# Patient Record
Sex: FEMALE | Race: WHITE | NOT HISPANIC OR LATINO | Employment: FULL TIME | URBAN - NONMETROPOLITAN AREA
[De-identification: names, ages, dates, MRNs, and addresses within clinical notes are randomized per-mention and may not be internally consistent; named-entity substitution may affect disease eponyms.]

---

## 2017-01-19 ENCOUNTER — TRANSCRIBE ORDERS (OUTPATIENT)
Dept: ADMINISTRATIVE | Facility: HOSPITAL | Age: 52
End: 2017-01-19

## 2017-01-19 DIAGNOSIS — R51.9 HEADACHE, UNSPECIFIED HEADACHE TYPE: Primary | ICD-10-CM

## 2017-01-20 ENCOUNTER — HOSPITAL ENCOUNTER (OUTPATIENT)
Dept: MRI IMAGING | Facility: HOSPITAL | Age: 52
Discharge: HOME | End: 2017-01-20

## 2017-01-20 ENCOUNTER — HOSPITAL ENCOUNTER (OUTPATIENT)
Dept: MRI IMAGING | Facility: HOSPITAL | Age: 52
Discharge: HOME | End: 2017-01-20
Admitting: NURSE PRACTITIONER

## 2017-01-20 DIAGNOSIS — R51.9 HEADACHE, UNSPECIFIED HEADACHE TYPE: ICD-10-CM

## 2017-01-20 PROCEDURE — 70551 MRI BRAIN STEM W/O DYE: CPT | Performed by: RADIOLOGY

## 2017-01-20 PROCEDURE — 70551 MRI BRAIN STEM W/O DYE: CPT

## 2017-01-20 PROCEDURE — 70544 MR ANGIOGRAPHY HEAD W/O DYE: CPT

## 2017-01-20 PROCEDURE — 70544 MR ANGIOGRAPHY HEAD W/O DYE: CPT | Performed by: RADIOLOGY

## 2017-01-23 ENCOUNTER — TELEPHONE (OUTPATIENT)
Dept: NEUROSURGERY | Facility: CLINIC | Age: 52
End: 2017-01-23

## 2017-01-23 NOTE — TELEPHONE ENCOUNTER
----- Message from Myra Perera sent at 1/23/2017  9:47 AM EST -----  Contact: [phone]  DARINEL CALLING TO LET YOU KNOW SHE HAS HAD HER SCANS COMPLETED AT [place].      PATIENT IS IN [facility].

## 2017-05-30 ENCOUNTER — TRANSCRIBE ORDERS (OUTPATIENT)
Dept: ADMINISTRATIVE | Facility: HOSPITAL | Age: 52
End: 2017-05-30

## 2017-05-30 DIAGNOSIS — M79.605 LEFT LEG PAIN: Primary | ICD-10-CM

## 2017-05-31 ENCOUNTER — OFFICE VISIT (OUTPATIENT)
Dept: CARDIOLOGY | Facility: CLINIC | Age: 52
End: 2017-05-31

## 2017-05-31 ENCOUNTER — HOSPITAL ENCOUNTER (OUTPATIENT)
Dept: CARDIOLOGY | Facility: HOSPITAL | Age: 52
Discharge: HOME | End: 2017-05-31
Admitting: NURSE PRACTITIONER

## 2017-05-31 VITALS — SYSTOLIC BLOOD PRESSURE: 115 MMHG | DIASTOLIC BLOOD PRESSURE: 78 MMHG

## 2017-05-31 DIAGNOSIS — I73.9 PAD (PERIPHERAL ARTERY DISEASE) (HCC): ICD-10-CM

## 2017-05-31 DIAGNOSIS — M79.605 LEFT LEG PAIN: ICD-10-CM

## 2017-05-31 DIAGNOSIS — M32.9 LUPUS (SYSTEMIC LUPUS ERYTHEMATOSUS) (HCC): ICD-10-CM

## 2017-05-31 DIAGNOSIS — R06.09 DYSPNEA ON EXERTION: ICD-10-CM

## 2017-05-31 DIAGNOSIS — R07.2 PRECORDIAL PAIN: Primary | ICD-10-CM

## 2017-05-31 PROCEDURE — 93971 EXTREMITY STUDY: CPT

## 2017-05-31 PROCEDURE — 93000 ELECTROCARDIOGRAM COMPLETE: CPT | Performed by: INTERNAL MEDICINE

## 2017-05-31 PROCEDURE — 93971 EXTREMITY STUDY: CPT | Performed by: RADIOLOGY

## 2017-05-31 PROCEDURE — 99203 OFFICE O/P NEW LOW 30 MIN: CPT | Performed by: INTERNAL MEDICINE

## 2017-05-31 RX ORDER — ATORVASTATIN CALCIUM 10 MG/1
10 TABLET, FILM COATED ORAL DAILY
Qty: 30 TABLET | Refills: 2 | Status: SHIPPED | OUTPATIENT
Start: 2017-05-31 | End: 2018-05-25

## 2017-05-31 RX ORDER — CILOSTAZOL 50 MG/1
50 TABLET ORAL 2 TIMES DAILY
Qty: 60 TABLET | Refills: 2 | Status: SHIPPED | OUTPATIENT
Start: 2017-05-31 | End: 2018-05-25

## 2017-06-01 ENCOUNTER — HOSPITAL ENCOUNTER (OUTPATIENT)
Facility: HOSPITAL | Age: 52
Setting detail: HOSPITAL OUTPATIENT SURGERY
End: 2017-06-01
Attending: INTERNAL MEDICINE | Admitting: INTERNAL MEDICINE

## 2017-06-01 ENCOUNTER — PREP FOR SURGERY (OUTPATIENT)
Dept: OTHER | Facility: HOSPITAL | Age: 52
End: 2017-06-01

## 2017-06-01 ENCOUNTER — TRANSCRIBE ORDERS (OUTPATIENT)
Dept: CARDIOLOGY | Facility: CLINIC | Age: 52
End: 2017-06-01

## 2017-06-01 DIAGNOSIS — I73.9 PVD (PERIPHERAL VASCULAR DISEASE) (HCC): ICD-10-CM

## 2017-06-01 DIAGNOSIS — R07.9 CHEST PAIN, CARDIAC: Primary | ICD-10-CM

## 2017-06-01 DIAGNOSIS — R07.9 CHEST PAIN, CARDIAC: ICD-10-CM

## 2017-06-05 ENCOUNTER — HOSPITAL ENCOUNTER (OUTPATIENT)
Facility: HOSPITAL | Age: 52
Setting detail: OBSERVATION
Discharge: HOME | End: 2017-06-05
Attending: EMERGENCY MEDICINE | Admitting: INTERNAL MEDICINE

## 2017-06-05 ENCOUNTER — APPOINTMENT (OUTPATIENT)
Dept: CT IMAGING | Facility: HOSPITAL | Age: 52
End: 2017-06-05

## 2017-06-05 ENCOUNTER — APPOINTMENT (OUTPATIENT)
Dept: GENERAL RADIOLOGY | Facility: HOSPITAL | Age: 52
End: 2017-06-05

## 2017-06-05 VITALS
SYSTOLIC BLOOD PRESSURE: 118 MMHG | OXYGEN SATURATION: 98 % | DIASTOLIC BLOOD PRESSURE: 84 MMHG | HEIGHT: 65 IN | RESPIRATION RATE: 12 BRPM | WEIGHT: 157.8 LBS | TEMPERATURE: 97.5 F | BODY MASS INDEX: 26.29 KG/M2 | HEART RATE: 57 BPM

## 2017-06-05 DIAGNOSIS — R06.09 DYSPNEA ON EXERTION: ICD-10-CM

## 2017-06-05 DIAGNOSIS — M32.9 LUPUS (SYSTEMIC LUPUS ERYTHEMATOSUS) (HCC): ICD-10-CM

## 2017-06-05 DIAGNOSIS — I24.9 ACUTE CORONARY SYNDROME (HCC): ICD-10-CM

## 2017-06-05 DIAGNOSIS — R07.2 PRECORDIAL PAIN: Primary | ICD-10-CM

## 2017-06-05 DIAGNOSIS — D72.819 LEUKOPENIA, UNSPECIFIED TYPE: ICD-10-CM

## 2017-06-05 DIAGNOSIS — I73.9 PERIPHERAL ARTERY DISEASE (HCC): ICD-10-CM

## 2017-06-05 DIAGNOSIS — I73.9 PAD (PERIPHERAL ARTERY DISEASE) (HCC): ICD-10-CM

## 2017-06-05 LAB
ALBUMIN SERPL-MCNC: 4.8 G/DL (ref 3.2–4.8)
ALBUMIN/GLOB SERPL: 1.6 G/DL (ref 1.5–2.5)
ALP SERPL-CCNC: 61 U/L (ref 25–100)
ALT SERPL W P-5'-P-CCNC: 25 U/L (ref 7–40)
ANION GAP SERPL CALCULATED.3IONS-SCNC: 5 MMOL/L (ref 3–11)
ANION GAP SERPL CALCULATED.3IONS-SCNC: 8 MMOL/L (ref 3–11)
APTT PPP: 27.5 SECONDS (ref 45–60)
APTT PPP: 91.3 SECONDS (ref 45–60)
ARTICHOKE IGE QN: 60 MG/DL (ref 0–130)
AST SERPL-CCNC: 33 U/L (ref 0–33)
BACTERIA UR QL AUTO: NORMAL /HPF
BASOPHILS # BLD AUTO: 0.03 10*3/MM3 (ref 0–0.2)
BASOPHILS # BLD AUTO: 0.05 10*3/MM3 (ref 0–0.2)
BASOPHILS NFR BLD AUTO: 1.5 % (ref 0–1)
BASOPHILS NFR BLD AUTO: 2.9 % (ref 0–1)
BILIRUB SERPL-MCNC: 0.5 MG/DL (ref 0.3–1.2)
BILIRUB UR QL STRIP: NEGATIVE
BNP SERPL-MCNC: 5 PG/ML (ref 0–100)
BUN BLD-MCNC: 13 MG/DL (ref 9–23)
BUN BLD-MCNC: 15 MG/DL (ref 9–23)
BUN/CREAT SERPL: 13 (ref 7–25)
BUN/CREAT SERPL: 15 (ref 7–25)
CALCIUM SPEC-SCNC: 10.1 MG/DL (ref 8.7–10.4)
CALCIUM SPEC-SCNC: 9.6 MG/DL (ref 8.7–10.4)
CHLORIDE SERPL-SCNC: 107 MMOL/L (ref 99–109)
CHLORIDE SERPL-SCNC: 109 MMOL/L (ref 99–109)
CHOLEST SERPL-MCNC: 144 MG/DL (ref 0–200)
CLARITY UR: CLEAR
CO2 SERPL-SCNC: 28 MMOL/L (ref 20–31)
CO2 SERPL-SCNC: 30 MMOL/L (ref 20–31)
COLOR UR: YELLOW
CREAT BLD-MCNC: 1 MG/DL (ref 0.6–1.3)
CREAT BLD-MCNC: 1 MG/DL (ref 0.6–1.3)
CRP SERPL-MCNC: 0.43 MG/DL (ref 0–1)
DEPRECATED RDW RBC AUTO: 39.1 FL (ref 37–54)
DEPRECATED RDW RBC AUTO: 39.4 FL (ref 37–54)
EOSINOPHIL # BLD AUTO: 0.01 10*3/MM3 (ref 0.1–0.3)
EOSINOPHIL # BLD AUTO: 0.02 10*3/MM3 (ref 0.1–0.3)
EOSINOPHIL NFR BLD AUTO: 0.6 % (ref 0–3)
EOSINOPHIL NFR BLD AUTO: 1 % (ref 0–3)
ERYTHROCYTE [DISTWIDTH] IN BLOOD BY AUTOMATED COUNT: 11.6 % (ref 11.3–14.5)
ERYTHROCYTE [DISTWIDTH] IN BLOOD BY AUTOMATED COUNT: 11.7 % (ref 11.3–14.5)
ERYTHROCYTE [SEDIMENTATION RATE] IN BLOOD: 3 MM/HR (ref 0–30)
GFR SERPL CREATININE-BSD FRML MDRD: 59 ML/MIN/1.73
GFR SERPL CREATININE-BSD FRML MDRD: 59 ML/MIN/1.73
GLOBULIN UR ELPH-MCNC: 3 GM/DL
GLUCOSE BLD-MCNC: 95 MG/DL (ref 70–100)
GLUCOSE BLD-MCNC: 96 MG/DL (ref 70–100)
GLUCOSE UR STRIP-MCNC: NEGATIVE MG/DL
HBA1C MFR BLD: 4.8 % (ref 4.8–5.6)
HCT VFR BLD AUTO: 40.9 % (ref 34.5–44)
HCT VFR BLD AUTO: 43.9 % (ref 34.5–44)
HDLC SERPL-MCNC: 68 MG/DL (ref 40–60)
HGB BLD-MCNC: 13.7 G/DL (ref 11.5–15.5)
HGB BLD-MCNC: 15 G/DL (ref 11.5–15.5)
HGB UR QL STRIP.AUTO: NEGATIVE
HOLD SPECIMEN: NORMAL
HOLD SPECIMEN: NORMAL
HYALINE CASTS UR QL AUTO: NORMAL /LPF
IMM GRANULOCYTES # BLD: 0.01 10*3/MM3 (ref 0–0.03)
IMM GRANULOCYTES # BLD: 0.01 10*3/MM3 (ref 0–0.03)
IMM GRANULOCYTES NFR BLD: 0.5 % (ref 0–0.6)
IMM GRANULOCYTES NFR BLD: 0.6 % (ref 0–0.6)
INR PPP: 0.89
KETONES UR QL STRIP: NEGATIVE
LEUKOCYTE ESTERASE UR QL STRIP.AUTO: ABNORMAL
LIPASE SERPL-CCNC: 56 U/L (ref 6–51)
LYMPHOCYTES # BLD AUTO: 0.71 10*3/MM3 (ref 0.6–4.8)
LYMPHOCYTES # BLD AUTO: 0.94 10*3/MM3 (ref 0.6–4.8)
LYMPHOCYTES NFR BLD AUTO: 41.3 % (ref 24–44)
LYMPHOCYTES NFR BLD AUTO: 46.5 % (ref 24–44)
MCH RBC QN AUTO: 30.4 PG (ref 27–31)
MCH RBC QN AUTO: 31.1 PG (ref 27–31)
MCHC RBC AUTO-ENTMCNC: 33.5 G/DL (ref 32–36)
MCHC RBC AUTO-ENTMCNC: 34.2 G/DL (ref 32–36)
MCV RBC AUTO: 90.7 FL (ref 80–99)
MCV RBC AUTO: 91.1 FL (ref 80–99)
MONOCYTES # BLD AUTO: 0.21 10*3/MM3 (ref 0–1)
MONOCYTES # BLD AUTO: 0.25 10*3/MM3 (ref 0–1)
MONOCYTES NFR BLD AUTO: 12.2 % (ref 0–12)
MONOCYTES NFR BLD AUTO: 12.4 % (ref 0–12)
NEUTROPHILS # BLD AUTO: 0.73 10*3/MM3 (ref 1.5–8.3)
NEUTROPHILS # BLD AUTO: 0.77 10*3/MM3 (ref 1.5–8.3)
NEUTROPHILS NFR BLD AUTO: 38.1 % (ref 41–71)
NEUTROPHILS NFR BLD AUTO: 42.4 % (ref 41–71)
NITRITE UR QL STRIP: NEGATIVE
PH UR STRIP.AUTO: 6.5 [PH] (ref 5–8)
PLAT MORPH BLD: NORMAL
PLATELET # BLD AUTO: 140 10*3/MM3 (ref 150–450)
PLATELET # BLD AUTO: 156 10*3/MM3 (ref 150–450)
PMV BLD AUTO: 11 FL (ref 6–12)
PMV BLD AUTO: 11.4 FL (ref 6–12)
POTASSIUM BLD-SCNC: 3.6 MMOL/L (ref 3.5–5.5)
POTASSIUM BLD-SCNC: 4.3 MMOL/L (ref 3.5–5.5)
PROT SERPL-MCNC: 7.8 G/DL (ref 5.7–8.2)
PROT UR QL STRIP: NEGATIVE
PROTHROMBIN TIME: 9.7 SECONDS (ref 9.6–11.5)
RBC # BLD AUTO: 4.51 10*6/MM3 (ref 3.89–5.14)
RBC # BLD AUTO: 4.82 10*6/MM3 (ref 3.89–5.14)
RBC # UR: NORMAL /HPF
RBC MORPH BLD: NORMAL
REF LAB TEST METHOD: NORMAL
SODIUM BLD-SCNC: 143 MMOL/L (ref 132–146)
SODIUM BLD-SCNC: 144 MMOL/L (ref 132–146)
SP GR UR STRIP: 1.01 (ref 1–1.03)
SQUAMOUS #/AREA URNS HPF: NORMAL /HPF
TRIGL SERPL-MCNC: 29 MG/DL (ref 0–150)
TROPONIN I SERPL-MCNC: 0 NG/ML (ref 0–0.07)
TROPONIN I SERPL-MCNC: 0 NG/ML (ref 0–0.07)
TROPONIN I SERPL-MCNC: <0.006 NG/ML
UROBILINOGEN UR QL STRIP: ABNORMAL
WBC MORPH BLD: NORMAL
WBC NRBC COR # BLD: 1.72 10*3/MM3 (ref 3.5–10.8)
WBC NRBC COR # BLD: 2.02 10*3/MM3 (ref 3.5–10.8)
WBC UR QL AUTO: NORMAL /HPF
WHOLE BLOOD HOLD SPECIMEN: NORMAL
WHOLE BLOOD HOLD SPECIMEN: NORMAL

## 2017-06-05 PROCEDURE — G0378 HOSPITAL OBSERVATION PER HR: HCPCS

## 2017-06-05 PROCEDURE — 86256 FLUORESCENT ANTIBODY TITER: CPT | Performed by: NURSE PRACTITIONER

## 2017-06-05 PROCEDURE — 84484 ASSAY OF TROPONIN QUANT: CPT

## 2017-06-05 PROCEDURE — 80048 BASIC METABOLIC PNL TOTAL CA: CPT | Performed by: NURSE PRACTITIONER

## 2017-06-05 PROCEDURE — 83036 HEMOGLOBIN GLYCOSYLATED A1C: CPT | Performed by: NURSE PRACTITIONER

## 2017-06-05 PROCEDURE — 86038 ANTINUCLEAR ANTIBODIES: CPT | Performed by: NURSE PRACTITIONER

## 2017-06-05 PROCEDURE — 96374 THER/PROPH/DIAG INJ IV PUSH: CPT

## 2017-06-05 PROCEDURE — 99220 PR INITIAL OBSERVATION CARE/DAY 70 MINUTES: CPT | Performed by: INTERNAL MEDICINE

## 2017-06-05 PROCEDURE — 85610 PROTHROMBIN TIME: CPT | Performed by: EMERGENCY MEDICINE

## 2017-06-05 PROCEDURE — 25010000002 HEPARIN (PORCINE) PER 1000 UNITS: Performed by: INTERNAL MEDICINE

## 2017-06-05 PROCEDURE — 85025 COMPLETE CBC W/AUTO DIFF WBC: CPT | Performed by: NURSE PRACTITIONER

## 2017-06-05 PROCEDURE — 93458 L HRT ARTERY/VENTRICLE ANGIO: CPT | Performed by: INTERNAL MEDICINE

## 2017-06-05 PROCEDURE — 71010 HC CHEST PA OR AP: CPT

## 2017-06-05 PROCEDURE — 83880 ASSAY OF NATRIURETIC PEPTIDE: CPT | Performed by: EMERGENCY MEDICINE

## 2017-06-05 PROCEDURE — 83520 IMMUNOASSAY QUANT NOS NONAB: CPT | Performed by: NURSE PRACTITIONER

## 2017-06-05 PROCEDURE — 75630 X-RAY AORTA LEG ARTERIES: CPT | Performed by: INTERNAL MEDICINE

## 2017-06-05 PROCEDURE — 80061 LIPID PANEL: CPT | Performed by: NURSE PRACTITIONER

## 2017-06-05 PROCEDURE — 93005 ELECTROCARDIOGRAM TRACING: CPT | Performed by: EMERGENCY MEDICINE

## 2017-06-05 PROCEDURE — C1769 GUIDE WIRE: HCPCS | Performed by: INTERNAL MEDICINE

## 2017-06-05 PROCEDURE — 96376 TX/PRO/DX INJ SAME DRUG ADON: CPT

## 2017-06-05 PROCEDURE — 86225 DNA ANTIBODY NATIVE: CPT | Performed by: NURSE PRACTITIONER

## 2017-06-05 PROCEDURE — 85652 RBC SED RATE AUTOMATED: CPT | Performed by: NURSE PRACTITIONER

## 2017-06-05 PROCEDURE — 85007 BL SMEAR W/DIFF WBC COUNT: CPT | Performed by: EMERGENCY MEDICINE

## 2017-06-05 PROCEDURE — 25010000002 HEPARIN (PORCINE) PER 1000 UNITS: Performed by: NURSE PRACTITIONER

## 2017-06-05 PROCEDURE — 96368 THER/DIAG CONCURRENT INF: CPT

## 2017-06-05 PROCEDURE — 80053 COMPREHEN METABOLIC PANEL: CPT | Performed by: EMERGENCY MEDICINE

## 2017-06-05 PROCEDURE — 96366 THER/PROPH/DIAG IV INF ADDON: CPT

## 2017-06-05 PROCEDURE — C1894 INTRO/SHEATH, NON-LASER: HCPCS | Performed by: INTERNAL MEDICINE

## 2017-06-05 PROCEDURE — 93005 ELECTROCARDIOGRAM TRACING: CPT | Performed by: NURSE PRACTITIONER

## 2017-06-05 PROCEDURE — 85730 THROMBOPLASTIN TIME PARTIAL: CPT | Performed by: EMERGENCY MEDICINE

## 2017-06-05 PROCEDURE — 25010000002 MIDAZOLAM PER 1 MG: Performed by: INTERNAL MEDICINE

## 2017-06-05 PROCEDURE — 85730 THROMBOPLASTIN TIME PARTIAL: CPT

## 2017-06-05 PROCEDURE — 84484 ASSAY OF TROPONIN QUANT: CPT | Performed by: NURSE PRACTITIONER

## 2017-06-05 PROCEDURE — 85025 COMPLETE CBC W/AUTO DIFF WBC: CPT | Performed by: EMERGENCY MEDICINE

## 2017-06-05 PROCEDURE — 25010000002 HEPARIN (PORCINE) PER 1000 UNITS: Performed by: PHYSICIAN ASSISTANT

## 2017-06-05 PROCEDURE — S0260 H&P FOR SURGERY: HCPCS | Performed by: INTERNAL MEDICINE

## 2017-06-05 PROCEDURE — 83690 ASSAY OF LIPASE: CPT | Performed by: EMERGENCY MEDICINE

## 2017-06-05 PROCEDURE — 99284 EMERGENCY DEPT VISIT MOD MDM: CPT

## 2017-06-05 PROCEDURE — 96365 THER/PROPH/DIAG IV INF INIT: CPT

## 2017-06-05 PROCEDURE — 71275 CT ANGIOGRAPHY CHEST: CPT

## 2017-06-05 PROCEDURE — 86140 C-REACTIVE PROTEIN: CPT | Performed by: NURSE PRACTITIONER

## 2017-06-05 PROCEDURE — 81001 URINALYSIS AUTO W/SCOPE: CPT | Performed by: NURSE PRACTITIONER

## 2017-06-05 PROCEDURE — 0 IOPAMIDOL PER 1 ML: Performed by: EMERGENCY MEDICINE

## 2017-06-05 RX ORDER — ASPIRIN 81 MG/1
324 TABLET, CHEWABLE ORAL ONCE
Status: DISCONTINUED | OUTPATIENT
Start: 2017-06-05 | End: 2017-06-06 | Stop reason: HOSPADM

## 2017-06-05 RX ORDER — SODIUM CHLORIDE 9 MG/ML
100 INJECTION, SOLUTION INTRAVENOUS CONTINUOUS
Status: ACTIVE | OUTPATIENT
Start: 2017-06-05 | End: 2017-06-05

## 2017-06-05 RX ORDER — CLOPIDOGREL BISULFATE 75 MG/1
75 TABLET ORAL DAILY
Status: DISCONTINUED | OUTPATIENT
Start: 2017-06-06 | End: 2017-06-06 | Stop reason: HOSPADM

## 2017-06-05 RX ORDER — CLOPIDOGREL BISULFATE 75 MG/1
600 TABLET ORAL ONCE
Status: COMPLETED | OUTPATIENT
Start: 2017-06-05 | End: 2017-06-05

## 2017-06-05 RX ORDER — LIDOCAINE HYDROCHLORIDE 10 MG/ML
INJECTION, SOLUTION INFILTRATION; PERINEURAL AS NEEDED
Status: DISCONTINUED | OUTPATIENT
Start: 2017-06-05 | End: 2017-06-05 | Stop reason: HOSPADM

## 2017-06-05 RX ORDER — ACETAMINOPHEN 325 MG/1
650 TABLET ORAL EVERY 6 HOURS PRN
Status: DISCONTINUED | OUTPATIENT
Start: 2017-06-05 | End: 2017-06-06 | Stop reason: HOSPADM

## 2017-06-05 RX ORDER — HEPARIN SODIUM 1000 [USP'U]/ML
60 INJECTION, SOLUTION INTRAVENOUS; SUBCUTANEOUS AS NEEDED
Status: DISCONTINUED | OUTPATIENT
Start: 2017-06-05 | End: 2017-06-05 | Stop reason: DRUGHIGH

## 2017-06-05 RX ORDER — ONDANSETRON 2 MG/ML
4 INJECTION INTRAMUSCULAR; INTRAVENOUS EVERY 6 HOURS PRN
Status: DISCONTINUED | OUTPATIENT
Start: 2017-06-05 | End: 2017-06-06 | Stop reason: HOSPADM

## 2017-06-05 RX ORDER — SODIUM CHLORIDE 0.9 % (FLUSH) 0.9 %
10 SYRINGE (ML) INJECTION AS NEEDED
Status: DISCONTINUED | OUTPATIENT
Start: 2017-06-05 | End: 2017-06-06 | Stop reason: HOSPADM

## 2017-06-05 RX ORDER — MIDAZOLAM HYDROCHLORIDE 1 MG/ML
INJECTION INTRAMUSCULAR; INTRAVENOUS AS NEEDED
Status: DISCONTINUED | OUTPATIENT
Start: 2017-06-05 | End: 2017-06-05 | Stop reason: HOSPADM

## 2017-06-05 RX ORDER — HEPARIN SODIUM 1000 [USP'U]/ML
30 INJECTION, SOLUTION INTRAVENOUS; SUBCUTANEOUS AS NEEDED
Status: DISCONTINUED | OUTPATIENT
Start: 2017-06-05 | End: 2017-06-05 | Stop reason: DRUGHIGH

## 2017-06-05 RX ORDER — ATORVASTATIN CALCIUM 40 MG/1
80 TABLET, FILM COATED ORAL NIGHTLY
Status: DISCONTINUED | OUTPATIENT
Start: 2017-06-05 | End: 2017-06-06 | Stop reason: HOSPADM

## 2017-06-05 RX ORDER — CARVEDILOL 3.12 MG/1
3.12 TABLET ORAL 2 TIMES DAILY
Status: DISCONTINUED | OUTPATIENT
Start: 2017-06-05 | End: 2017-06-06 | Stop reason: HOSPADM

## 2017-06-05 RX ORDER — ESTRADIOL 0.04 MG/D
1 PATCH TRANSDERMAL WEEKLY
COMMUNITY
End: 2018-05-25

## 2017-06-05 RX ORDER — HYDROXYCHLOROQUINE SULFATE 200 MG/1
100 TABLET, FILM COATED ORAL 2 TIMES DAILY
COMMUNITY
End: 2018-05-25

## 2017-06-05 RX ORDER — ASPIRIN 81 MG/1
81 TABLET ORAL DAILY
Status: DISCONTINUED | OUTPATIENT
Start: 2017-06-06 | End: 2017-06-06 | Stop reason: HOSPADM

## 2017-06-05 RX ORDER — NITROGLYCERIN 20 MG/100ML
5-200 INJECTION INTRAVENOUS
Status: DISCONTINUED | OUTPATIENT
Start: 2017-06-05 | End: 2017-06-05

## 2017-06-05 RX ORDER — ASPIRIN 81 MG/1
81 TABLET, CHEWABLE ORAL DAILY
COMMUNITY
End: 2017-06-19 | Stop reason: DRUGHIGH

## 2017-06-05 RX ORDER — HEPARIN SODIUM 1000 [USP'U]/ML
57.2 INJECTION, SOLUTION INTRAVENOUS; SUBCUTANEOUS ONCE
Status: COMPLETED | OUTPATIENT
Start: 2017-06-05 | End: 2017-06-05

## 2017-06-05 RX ORDER — SODIUM CHLORIDE 0.9 % (FLUSH) 0.9 %
1-10 SYRINGE (ML) INJECTION AS NEEDED
Status: DISCONTINUED | OUTPATIENT
Start: 2017-06-05 | End: 2017-06-06 | Stop reason: HOSPADM

## 2017-06-05 RX ADMIN — CARVEDILOL 3.12 MG: 3.12 TABLET, FILM COATED ORAL at 17:57

## 2017-06-05 RX ADMIN — NITROGLYCERIN 5 MCG/MIN: 20 INJECTION INTRAVENOUS at 02:04

## 2017-06-05 RX ADMIN — IOPAMIDOL 55 ML: 755 INJECTION, SOLUTION INTRAVENOUS at 01:59

## 2017-06-05 RX ADMIN — HEPARIN SODIUM 12 UNITS/KG/HR: 10000 INJECTION, SOLUTION INTRAVENOUS at 05:08

## 2017-06-05 RX ADMIN — SODIUM CHLORIDE 100 ML/HR: 9 INJECTION, SOLUTION INTRAVENOUS at 15:00

## 2017-06-05 RX ADMIN — HEPARIN SODIUM 12 UNITS/KG/HR: 10000 INJECTION, SOLUTION INTRAVENOUS at 05:32

## 2017-06-05 RX ADMIN — CARVEDILOL 3.12 MG: 3.12 TABLET, FILM COATED ORAL at 08:25

## 2017-06-05 RX ADMIN — CLOPIDOGREL BISULFATE 600 MG: 75 TABLET ORAL at 05:14

## 2017-06-05 RX ADMIN — HEPARIN SODIUM 4000 UNITS: 1000 INJECTION, SOLUTION INTRAVENOUS; SUBCUTANEOUS at 04:35

## 2017-06-05 RX ADMIN — ACETAMINOPHEN 650 MG: 325 TABLET, FILM COATED ORAL at 05:14

## 2017-06-05 RX ADMIN — HEPARIN SODIUM 12 UNITS/KG/HR: 10000 INJECTION, SOLUTION INTRAVENOUS at 04:36

## 2017-06-05 NOTE — PROGRESS NOTES
"Hospitalist  NOTE     Date of Admission: 6/5/2017   LOS: 0 days   PCP: LAURIE Esparza  Ms. Elham Calderón, 51 y.o. female is followed for:  Chief Complaint: chest pain, SOA           SUBJECTIVE   HPI: Patient is sitting up in bed in NAD. She states she is feeling better has some cp this am. She denies any soa, fever, chills or n/v/d. She currently has a TR band on her left wrist with a soft hematoma below band with an ice pack. She also had a IV infiltrate in same area.   Subjective:  Symptoms:  She reports chest pain.  No shortness of breath or diarrhea.    Diet:  No nausea or vomiting.        The patient's relevant past medical, surgical and social history were reviewed and updated in Epic as appropriate.    Review of Systems:  Review of Systems   Constitutional: Negative for appetite change, chills and fever.   Respiratory: Negative for shortness of breath and wheezing.    Cardiovascular: Positive for chest pain. Negative for palpitations and leg swelling.   Gastrointestinal: Negative for constipation, diarrhea, nausea and vomiting.   Genitourinary: Negative for dysuria.     Otherwise complete ROS negative except as mentioned in the HPI        OBJECTIVE     Vital Sign Min/Max for last 24 hours  Temp  Min: 97.8 °F (36.6 °C)  Max: 98 °F (36.7 °C)   BP  Min: 91/70  Max: 155/86   Pulse  Min: 60  Max: 81   Resp  Min: 16  Max: 20   SpO2  Min: 92 %  Max: 100 %   No Data Recorded   Weight  Min: 154 lb (69.9 kg)  Max: 157 lb 12.8 oz (71.6 kg)    No intake or output data in the 24 hours ending 06/05/17 1447   Telemetry sr Last 3 weights    06/05/17  0040 06/05/17  0506   Weight: 154 lb (69.9 kg) 157 lb 12.8 oz (71.6 kg)          Objective:  Vital signs: (most recent): Blood pressure 130/78, pulse 77, temperature 98 °F (36.7 °C), temperature source Oral, resp. rate 16, height 65\" (165.1 cm), weight 157 lb 12.8 oz (71.6 kg), SpO2 96 %.  No fever.              Physical Exam:   Constitutional: Oriented to person, place, " and time. Well-developed and well-nourished. No distress.   Head: Normocephalic and atraumatic.    Eyes: Conjunctivae and EOM are normal.   Neck: Normal range of motion.   Cardiovascular: Normal rate, regular rhythm, normal heart sounds and intact distal pulses.  Exam reveals no gallop and no friction rub.  No murmur appreciated.  Pulmonary/Chest: Effort normal and breath sounds normal. No respiratory distress.  No rales rhonchi or wheezing   Abdominal: Soft.  Normal bowel sounds, no distention, no mass, no tenderness  Musculoskeletal: Normal range of motion. He exhibits no edema.   Neurological: No focal deficits   Skin: Skin is warm and dry. No rash noted. Nails show no clubbing. pt has left wrist TR band with hematoma below band.   Mood: Appropriate and pleasant  Results:    Results from last 7 days  Lab Units 06/05/17 0524 06/05/17 0054   WBC 10*3/mm3 2.02* 1.72*   HEMOGLOBIN g/dL 13.7 15.0   HEMATOCRIT % 40.9 43.9   PLATELETS 10*3/mm3 140* 156       Results from last 7 days  Lab Units 06/05/17 0524   SODIUM mmol/L 143   POTASSIUM mmol/L 3.6   CHLORIDE mmol/L 107   TOTAL CO2 mmol/L 28.0   BUN mg/dL 13   CREATININE mg/dL 1.00   GLUCOSE mg/dL 96   CALCIUM mg/dL 9.6       Results from last 7 days  Lab Units 06/05/17 0054   ALK PHOS U/L 61   BILIRUBIN mg/dL 0.5   ALT (SGPT) U/L 25   AST (SGOT) U/L 33         Imaging Results (last 24 hours)     Procedure Component Value Units Date/Time    XR Chest 1 View [[id number]]  (Abnormal) Collected:  06/05/17 0040     Updated:  06/05/17 0142    Narrative:         EXAM:    XR Chest, 1 View    CLINICAL HISTORY:    51 years old, female; Pain; Chest pain and sternal or substernal pain; On   breathing; Additional info: Chest pain triage protocol    TECHNIQUE:    Frontal view of the chest.    COMPARISON:    No relevant prior studies available.    FINDINGS:    Lungs:  The lungs are clear.    Pleural space:  Unremarkable.  No pneumothorax.    Heart:  Unremarkable.    Mediastinum:   Unremarkable.    Bones/joints:  Unremarkable.      Impression:         No acute findings.    THIS DOCUMENT HAS BEEN ELECTRONICALLY SIGNED BY LIZZIE RANDOLPH MD    CT Angiogram Chest With & Without Contrast [[id number]] Collected:  06/05/17 0054     Updated:  06/05/17 0236    Narrative:         EXAM:    CT Angiography Chest With Intravenous Contrast    CLINICAL HISTORY:    51 years old, female; Signs and symptoms; Shortness of breath; Additional   info: SOA cp, recent dx polliteal occlusion    TECHNIQUE:    Axial computed tomographic angiography images of the chest with intravenous   contrast using pulmonary embolism protocol.  This CT exam was performed using   one or more of the following dose reduction techniques:  automated exposure   control, adjustment of the mA and/or kV according to patient size, and/or use   of iterative reconstruction technique.    MIP reconstructed images were created and reviewed.    CONTRAST:    55 mL of [id number] administered intravenously.    COMPARISON:    No relevant prior studies available.    FINDINGS:    Pulmonary arteries:  No pulmonary embolism is identified.    Aorta:  No acute findings.  No thoracic aortic aneurysm.    Lungs: Unremarkable.  No mass.  No consolidation.    Pleural space:  No significant effusion.  No pneumothorax.    Heart:  Unremarkable.    Bones/joints:  No acute fracture.  No dislocation.    Soft tissues:  Unremarkable.    Lymph nodes:  Unremarkable. No enlarged lymph nodes.      Impression:         No evidence of pulmonary embolus.    THIS DOCUMENT HAS BEEN ELECTRONICALLY SIGNED BY LIZZIE RANDOLPH MD        Lab Results   Component Value Date    GLUCOSE 96 06/05/2017    GLUCOSE 95 06/05/2017     Current Medications:  aspirin 324 mg Oral Once   [START ON 6/6/2017] aspirin 81 mg Oral Daily   atorvastatin 80 mg Oral Nightly   carvedilol 3.125 mg Oral BID   [START ON 6/6/2017] clopidogrel 75 mg Oral Daily       sodium chloride 100 mL/hr       I reviewed the patient's  results and images. Images:    Medications reviewed.      Assessment/Plan   ASSESSMENT / PLAN    Principal Problem:    Acute coronary syndrome  Active Problems:    Precordial pain    Dyspnea on exertion    PAD (peripheral artery disease) and up to 6% stenosis in the mid popliteal artery    Lupus (systemic lupus erythematosus)    Claudication of left lower extremity  51 Y f, presented to ed-chest pain-central squeezing relief with nitro.  Worse today as compared to past few months.  As's with dyspnea, could not catch deep breath.  No fever, no as's diaphoresis, n, v.  -no cough.  EKG, MAGGIE negative.  Also recently was dx, with L.lower ext pain-with popliteal artery stenosis-suppose to have angiogram with Dr. 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  Because of Gen fatigue and high double str-dna positive, was dx with SLE-on plaquenil currently.     Patient had heart cath today with lower extremity angiograms. Heart cath showed no significant CAD. EF 65%. Patient did have 90% stenosis in left popliteal artery. Wendy consulted will await his recommendations.       Assessment & Plan      Plan of care and goals reviewed with patient and staff.      I discussed the patient's findings and my recommendations with patient      Anuradha Calderon, LAURIE 06/05/17 2:47 PM

## 2017-06-05 NOTE — PLAN OF CARE
"Problem: Patient Care Overview (Adult)  Goal: Plan of Care Review    06/05/17 0524   Coping/Psychosocial Response Interventions   Plan Of Care Reviewed With patient   Patient Care Overview   Progress no change   Outcome Evaluation   Outcome Summary/Follow up Plan VSS. Continues to have \"squeezing\" midsternal chest discomfort, 1 on 1-10 scale. NTG and Heparin gtt continues. Awaiting cardiology consult.           "

## 2017-06-05 NOTE — H&P
[facility] Medicine Services  HISTORY AND PHYSICAL    Primary Care Physician: LAURIE Esparza    Subjective     Chief Complaint: chest pain, SOA    History of Present Illness:   51 year old female with PMH significant for recent diagnosis of lupus and recent diagnosis of left popliteal artery occlusion that presents to the ED with complaints of chest pain. She states that over the past 6+ months, she has had intermittent chest pain. She notes that she flew to [place] this past week, and since returning her cp has increased. She rates the pain 2/10 and states that it remains intermittent but has been occurring more often over the past several days and now is a squeezing sensation and has caused her to be SOA.  She denies any N/V/D or fever. She notes that she was scheduled with Dr Atkinson later this week for heart cath and angiogram. Hospital Medicine will admit and follow.       Review of Systems   Constitutional: Positive for activity change. Negative for appetite change, diaphoresis, fatigue, fever and unexpected weight change.   HENT: Negative.    Respiratory: Positive for chest tightness and shortness of breath. Negative for cough and wheezing.    Cardiovascular: Positive for chest pain. Negative for palpitations and leg swelling.   Gastrointestinal: Negative for abdominal pain, constipation, diarrhea, nausea and vomiting.   Genitourinary: Negative for dysuria, frequency and urgency.   Musculoskeletal: Positive for myalgias. Negative for back pain.        Claudication left leg   Skin: Negative for color change and pallor.   Neurological: Negative for dizziness, syncope, weakness and headaches.   Psychiatric/Behavioral: Negative for confusion. The patient is not nervous/anxious.       Otherwise complete ROS performed and negative except as mentioned in the HPI.    Past Medical History:   Diagnosis Date   • Familial neutropenia    • Lupus        Past Surgical History:   Procedure  "Laterality Date   • HYSTERECTOMY         Family History   Problem Relation Age of Onset   • Heart disease Father        Social History     Social History   • Marital status: Single     Spouse name: N/A   • Number of children: N/A   • Years of education: N/A     Occupational History   • Not on file.     Social History Main Topics   • Smoking status: Never Smoker   • Smokeless tobacco: Not on file   • Alcohol use 1.2 oz/week     2 Glasses of wine per week   • Drug use: No   • Sexual activity: Not on file     Other Topics Concern   • Not on file     Social History Narrative   • No narrative on file       Medications:    (Not in a hospital admission)    Allergies:  No Known Allergies      Objective     Physical Exam:  Vital Signs: /83  Pulse 74  Temp 98 °F (36.7 °C) (Oral)   Resp 20  Ht 65\" (165.1 cm)  Wt 154 lb (69.9 kg)  SpO2 98%  BMI 25.63 kg/m2  Physical Exam   Constitutional: She is oriented to person, place, and time. She appears well-developed and well-nourished. No distress.   HENT:   Head: Normocephalic.   Eyes: Pupils are equal, round, and reactive to light.   Neck: Neck supple. No JVD present.   Cardiovascular: Normal rate, regular rhythm, normal heart sounds and intact distal pulses.  Exam reveals no gallop and no friction rub.    No murmur heard.  Pulmonary/Chest: Effort normal and breath sounds normal. She has no wheezes. She has no rales.   Abdominal: Soft. Bowel sounds are normal. She exhibits no distension. There is no tenderness.   Musculoskeletal: Normal range of motion. She exhibits no edema or tenderness.   Neurological: She is alert and oriented to person, place, and time.   Skin: Skin is warm and dry. She is not diaphoretic.   Psychiatric: Her speech is normal and behavior is normal. Judgment and thought content normal. Her mood appears anxious. Cognition and memory are normal.   Vitals reviewed.          Results Reviewed:    Results from last 7 days  Lab Units 06/05/17  0054   WBC " 10*3/mm3 1.72*   HEMOGLOBIN g/dL 15.0   PLATELETS 10*3/mm3 156       Results from last 7 days  Lab Units 06/05/17  0054   SODIUM mmol/L 144   POTASSIUM mmol/L 4.3   TOTAL CO2 mmol/L 30.0   CREATININE mg/dL 1.00   GLUCOSE mg/dL 95   CALCIUM mg/dL 10.1     Imaging Results (last 24 hours)     Procedure Component Value Units Date/Time    XR Chest 1 View [[id number]]  (Abnormal) Collected:  06/05/17 0040     Updated:  06/05/17 0142    Narrative:         EXAM:    XR Chest, 1 View    CLINICAL HISTORY:    51 years old, female; Pain; Chest pain and sternal or substernal pain; On   breathing; Additional info: Chest pain triage protocol    TECHNIQUE:    Frontal view of the chest.    COMPARISON:    No relevant prior studies available.    FINDINGS:    Lungs:  The lungs are clear.    Pleural space:  Unremarkable.  No pneumothorax.    Heart:  Unremarkable.    Mediastinum:  Unremarkable.    Bones/joints:  Unremarkable.      Impression:         No acute findings.    THIS DOCUMENT HAS BEEN ELECTRONICALLY SIGNED BY LIZZIE RANDOLPH MD    CT Angiogram Chest With & Without Contrast [[id number]] Collected:  06/05/17 0054     Updated:  06/05/17 0236    Narrative:         EXAM:    CT Angiography Chest With Intravenous Contrast    CLINICAL HISTORY:    51 years old, female; Signs and symptoms; Shortness of breath; Additional   info: SOA cp, recent dx polliteal occlusion    TECHNIQUE:    Axial computed tomographic angiography images of the chest with intravenous   contrast using pulmonary embolism protocol.  This CT exam was performed using   one or more of the following dose reduction techniques:  automated exposure   control, adjustment of the mA and/or kV according to patient size, and/or use   of iterative reconstruction technique.    MIP reconstructed images were created and reviewed.    CONTRAST:    55 mL of [id number] administered intravenously.    COMPARISON:    No relevant prior studies available.    FINDINGS:    Pulmonary arteries:  No  pulmonary embolism is identified.    Aorta:  No acute findings.  No thoracic aortic aneurysm.    Lungs: Unremarkable.  No mass.  No consolidation.    Pleural space:  No significant effusion.  No pneumothorax.    Heart:  Unremarkable.    Bones/joints:  No acute fracture.  No dislocation.    Soft tissues:  Unremarkable.    Lymph nodes:  Unremarkable. No enlarged lymph nodes.      Impression:         No evidence of pulmonary embolus.    THIS DOCUMENT HAS BEEN ELECTRONICALLY SIGNED BY LIZZIE RANDOLPH MD          I have personally reviewed and interpreted available lab data, radiology studies and ECG obtained at time of admission.     Assessment / Plan     Assessment/Problem List:   Hospital Problem List     * (Principal)Acute coronary syndrome    Dyspnea on exertion    PAD (peripheral artery disease) and up to 6% stenosis in the mid popliteal artery    Lupus (systemic lupus erythematosus)    Claudication of left lower extremity        51 year old female presenting to ED with complaints of intermittent chest pain for the past 6 months that has increased in frequency and intensity over the past several days. Recent diagnosis of Lupus as well as popliteal artery occlusion.     Plan:  Acute Coronary Syndrome  -Consult Cardiology in AM, Dr Atkinson  -NPO  -Heparin gtt  -Nitro gtt  -Trend troponin  -Trend EKG  -ASA 324mg, then 81 daily  -Plavix load, then 75 daily  -Pt on Pletal, will hold for now  -Questionable relation to Lupus/vasculitis     PAD with Claudication Left Lower extremity  -Stenosis of popliteal artery  -Previous scheduled to see Dr Atkinson    Lupus  -Recent Diagnosis  -Will check CRP, ESR, INCA, NIKOLAY    DVT prophylaxis:  -On heparin gtt  -Hold teds/scds secondary to PAD    Code Status: Full    Admission Status: Patient will be admitted to OBSERVATION status, however if further evaluation or treatment plans warrant, status may change.  Based upon current information, I predict patient's care encounter to be less than  or equal to 2 midnights.       Nora DAVID Jett, APRN 06/05/17 3:53 AM      PHYSICIAN NOTE    51 Y f, presented to ed-chest pain-central squeezing relief with nitro.  Worse today as compared to past few months.  As's with dyspnea, could not catch deep breath.  No fever, no as's diaphoresis, n, v.  -no cough.  EKG, MAGGIE negative.  Also recently was dx, with L.lower ext pain-with popliteal artery stenosis-suppose to have angiogram with Dr. Atkinson,     -on asa, Plavix, heparin and nitro drip.    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  Because of Gen fatigue and high double str-dna positive, was dx with SLE-on plaquenil currently.    I have independently seen and examined the patient with ARNP and the note above reflects my changes and contributions. I have discussed with findings, diagnosis and plans with the patient and family.    Rest a/p as per nps note.     Lynda Leon MD 06/05/17 5:41 AM       Please note that portions of this note may have been completed with a voice recognition program. Efforts were made to edit the dictations, but occasionally words are mistranscribed.

## 2017-06-05 NOTE — OP NOTE
FINAL IMPRESSION:   · 90% focal stenosis of the left popliteal artery.  · No other hemodynamically significant peripheral arterial disease of the lower extremities.    RECOMMENDATIONS:   · Optimize medical management and risk factor modification.   · Vascular surgery consultation.    Indications:  Lower steady claudications, peripheral arterial disease, abnormal vascular ultrasound.    Access:   Left radial.    Procedures:   · Abdominal aortogram.  · Lower extremity ilio-femoral run off angiograms.     Procedure narrative:  Following the cardiac catheterization study the pigtail catheter was advanced into the abdominal aorta and above procedures were performed.  See cardiac catheter is in report for details of the procedure.      Angiographic Findings:  · Abdominal aorta: Angiographically normal.  No evidence of significant plaque, stenosis or aneurysm.  · Renal arteries: Angiographically normal bilaterally.  · Iliac arteries: Angiographically normal bilaterally.  · Femorals: Angiographically normal bilaterally.  · Profunda femoral arteries: Normal bilaterally.   · Superficial femoral/Politeal arteries:   · Left : 90% focal stenosis of the left popliteal artery.  The superficial femoral is normal.    · Right: Angiographically normal superficial femoral and popliteal.  · Lower legs:  Three vessel runoff bilaterally, no significant obstructive disease is noted.

## 2017-06-05 NOTE — ED PROVIDER NOTES
Subjective   Patient is a 51 y.o. female presenting with chest pain.   History provided by:  Patient  Chest Pain   Pain location:  Substernal area  Pain quality: pressure and tightness    Pain radiates to:  Does not radiate  Pain severity:  Mild  Onset quality:  Gradual  Duration:  6 months  Timing:  Intermittent  Progression:  Worsening  Chronicity:  Recurrent  Context: not breathing (feels soa), not movement, not raising an arm and not trauma    Relieved by:  Nothing  Worsened by:  Nothing  Ineffective treatments:  None tried  Associated symptoms: claudication (LLE claudication secondary to popliteal artery occlusion), nausea, palpitations and shortness of breath    Associated symptoms: no abdominal pain, no back pain, no cough, no diaphoresis, no dizziness, no fatigue, no fever, no heartburn, no lower extremity edema, no near-syncope, no numbness, no orthopnea, no PND, no syncope, no vomiting and no weakness    Nausea:     Severity:  Mild    Onset quality:  Gradual    Duration:  3 days    Timing:  Constant  Risk factors: no coronary artery disease, no high cholesterol, no hypertension and no immobilization    Risk factors comment:  Lupus    51-year-old female presents to emergency department complaining of intermittent and worsening six-month history of substernal chest pressure associated with shortness of breath.  Patient states symptoms have worsened since returning from flight to and from [facility] for a high school graduation.  She states she has recently had claudication in the left lower extremity which led to discovery of popliteal artery occlusion, scheduled for angiogram and catheterization 4 days from now with Dr. Atkinson. She is normally very active, athletic, and is tolerant of exercise however at rest now feels as though she cannot get a good breath, with constant MS chest pressure.    Recently diagnosed with lupus, placed on Plaquenil by her rheumatologist.  She is also taking Lipitor and  Pletal      Review of Systems   Constitutional: Negative for diaphoresis, fatigue and fever.   HENT: Negative for congestion and sore throat.    Respiratory: Positive for shortness of breath. Negative for cough, choking, chest tightness and wheezing.    Cardiovascular: Positive for chest pain, palpitations and claudication (LLE claudication secondary to popliteal artery occlusion). Negative for orthopnea, leg swelling, syncope, PND and near-syncope.   Gastrointestinal: Positive for nausea. Negative for abdominal distention, abdominal pain, diarrhea, heartburn and vomiting.   Musculoskeletal: Negative for back pain.   Neurological: Negative for dizziness, weakness and numbness.   All other systems reviewed and are negative.      Past Medical History:   Diagnosis Date   • Familial neutropenia    • Lupus        No Known Allergies    Past Surgical History:   Procedure Laterality Date   • HYSTERECTOMY         History reviewed. No pertinent family history.    Social History     Social History   • Marital status: Single     Spouse name: N/A   • Number of children: N/A   • Years of education: N/A     Social History Main Topics   • Smoking status: Never Smoker   • Smokeless tobacco: None   • Alcohol use 1.2 oz/week     2 Glasses of wine per week   • Drug use: No   • Sexual activity: Not Asked     Other Topics Concern   • None     Social History Narrative   • None           Objective   Physical Exam   Constitutional: She is oriented to person, place, and time. She appears well-developed and well-nourished. No distress.   Pleasant awake alert oriented not toxic appearing afebrile, blood pressure 143/86, heart rate 72 and regular, respirations 20 clear and unlabored, pulse oximetry % on room air.   HENT:   Head: Normocephalic and atraumatic.   Right Ear: External ear normal.   Left Ear: External ear normal.   Nose: Nose normal.   Mouth/Throat: Oropharynx is clear and moist. No oropharyngeal exudate.   Eyes: Conjunctivae  and EOM are normal. Pupils are equal, round, and reactive to light. Right eye exhibits no discharge. Left eye exhibits no discharge. No scleral icterus.   Neck: Normal range of motion. Neck supple. No JVD present. No tracheal deviation present. No thyromegaly present.   Cardiovascular: Normal rate, regular rhythm, normal heart sounds and intact distal pulses.  Exam reveals no gallop and no friction rub.    No murmur heard.  Pulmonary/Chest: Effort normal. No stridor. No respiratory distress. She has no wheezes. She has no rales. She exhibits no tenderness.   Abdominal: Soft. She exhibits no distension.   Musculoskeletal: Normal range of motion. She exhibits no edema, tenderness or deformity.   Neurological: She is alert and oriented to person, place, and time. No cranial nerve deficit. She exhibits normal muscle tone. Coordination normal.   Skin: Skin is warm and dry. No rash noted. She is not diaphoretic. No erythema. No pallor.   Psychiatric: She has a normal mood and affect. Her behavior is normal. Judgment and thought content normal.   Nursing note and vitals reviewed.      Procedures        Recent Results (from the past 24 hour(s))   Comprehensive Metabolic Panel    Collection Time: 06/05/17 12:54 AM   Result Value Ref Range    Glucose 95 70 - 100 mg/dL    BUN 15 9 - 23 mg/dL    Creatinine 1.00 0.60 - 1.30 mg/dL    Sodium 144 132 - 146 mmol/L    Potassium 4.3 3.5 - 5.5 mmol/L    Chloride 109 99 - 109 mmol/L    CO2 30.0 20.0 - 31.0 mmol/L    Calcium 10.1 8.7 - 10.4 mg/dL    Total Protein 7.8 5.7 - 8.2 g/dL    Albumin 4.80 3.20 - 4.80 g/dL    ALT (SGPT) 25 7 - 40 U/L    AST (SGOT) 33 0 - 33 U/L    Alkaline Phosphatase 61 25 - 100 U/L    Total Bilirubin 0.5 0.3 - 1.2 mg/dL    eGFR Non African Amer 59 (L) >60 mL/min/1.73    Globulin 3.0 gm/dL    A/G Ratio 1.6 1.5 - 2.5 g/dL    BUN/Creatinine Ratio 15.0 7.0 - 25.0    Anion Gap 5.0 3.0 - 11.0 mmol/L   Lipase    Collection Time: 06/05/17 12:54 AM   Result Value Ref  Range    Lipase 56 (H) 6 - 51 U/L   BNP    Collection Time: 06/05/17 12:54 AM   Result Value Ref Range    BNP 5.0 0.0 - 100.0 pg/mL   Light Blue Top    Collection Time: 06/05/17 12:54 AM   Result Value Ref Range    Extra Tube hold for add-on    Green Top (Gel)    Collection Time: 06/05/17 12:54 AM   Result Value Ref Range    Extra Tube Hold for add-ons.    Lavender Top    Collection Time: 06/05/17 12:54 AM   Result Value Ref Range    Extra Tube hold for add-on    Gold Top - SST    Collection Time: 06/05/17 12:54 AM   Result Value Ref Range    Extra Tube Hold for add-ons.    CBC Auto Differential    Collection Time: 06/05/17 12:54 AM   Result Value Ref Range    WBC 1.72 (C) 3.50 - 10.80 10*3/mm3    RBC 4.82 3.89 - 5.14 10*6/mm3    Hemoglobin 15.0 11.5 - 15.5 g/dL    Hematocrit 43.9 34.5 - 44.0 %    MCV 91.1 80.0 - 99.0 fL    MCH 31.1 (H) 27.0 - 31.0 pg    MCHC 34.2 32.0 - 36.0 g/dL    RDW 11.6 11.3 - 14.5 %    RDW-SD 39.4 37.0 - 54.0 fl    MPV 11.0 6.0 - 12.0 fL    Platelets 156 150 - 450 10*3/mm3    Neutrophil % 42.4 41.0 - 71.0 %    Lymphocyte % 41.3 24.0 - 44.0 %    Monocyte % 12.2 (H) 0.0 - 12.0 %    Eosinophil % 0.6 0.0 - 3.0 %    Basophil % 2.9 (H) 0.0 - 1.0 %    Immature Grans % 0.6 0.0 - 0.6 %    Neutrophils, Absolute 0.73 (L) 1.50 - 8.30 10*3/mm3    Lymphocytes, Absolute 0.71 0.60 - 4.80 10*3/mm3    Monocytes, Absolute 0.21 0.00 - 1.00 10*3/mm3    Eosinophils, Absolute 0.01 (L) 0.10 - 0.30 10*3/mm3    Basophils, Absolute 0.05 0.00 - 0.20 10*3/mm3    Immature Grans, Absolute 0.01 0.00 - 0.03 10*3/mm3   Scan Slide    Collection Time: 06/05/17 12:54 AM   Result Value Ref Range    RBC Morphology Normal Normal    WBC Morphology Normal Normal    Platelet Morphology Normal Normal   POC Troponin, Rapid    Collection Time: 06/05/17  1:02 AM   Result Value Ref Range    Troponin I 0.00 0.00 - 0.07 ng/mL   POC Troponin, Rapid    Collection Time: 06/05/17  2:52 AM   Result Value Ref Range    Troponin I 0.00 0.00 - 0.07  ng/mL     Note: In addition to lab results from this visit, the labs listed above may include labs taken at another facility or during a different encounter within the last 24 hours. Please correlate lab times with ED admission and discharge times for further clarification of the services performed during this visit.    CT Angiogram Chest With & Without Contrast   ED Interpretation     No evidence of pulmonary embolus.      THIS DOCUMENT HAS BEEN ELECTRONICALLY SIGNED BY LIZZIE RANDOLPH MD      Final Result   Abnormal     No evidence of pulmonary embolus.      THIS DOCUMENT HAS BEEN ELECTRONICALLY SIGNED BY LIZZIE RANDOLPH MD      XR Chest 1 View   Final Result   Abnormal     No acute findings.      THIS DOCUMENT HAS BEEN ELECTRONICALLY SIGNED BY LIZZIE RANDOLPH MD        Vitals:    06/05/17 0203 06/05/17 0215 06/05/17 0230 06/05/17 0300   BP:  114/77 116/75 118/83   BP Location:       Patient Position:       Pulse: 81 77 77 74   Resp:       Temp:       TempSrc:       SpO2: 97% 98% 95% 98%   Weight:       Height:         Medications   sodium chloride 0.9 % flush 10 mL (not administered)   aspirin chewable tablet 324 mg (324 mg Oral Not Given 6/5/17 0137)   nitroglycerin 50 mg/250 mL (0.2 mg/mL) infusion (10 mcg/min Intravenous Rate/Dose Change 6/5/17 6115)   heparin (porcine) injection 4,000 Units (not administered)   heparin 80636 units/250 mL (100 units/mL) in 0.45 % NaCl infusion (not administered)   iopamidol (ISOVUE-370) 76 % injection 100 mL (55 mL Intravenous Given 6/5/17 0159)     ECG/EMG Results (last 24 hours)     Procedure Component Value Units Date/Time    ECG 12 Lead [[id number]] Collected:  06/05/17 0042     Updated:  06/05/17 0102    Narrative:       Test Reason : chest pain  Blood Pressure : **/** mmHG  Vent. Rate : 080 BPM     Atrial Rate : 080 BPM     P-R Int : 132 ms          QRS Dur : 076 ms      QT Int : 388 ms       P-R-T Axes : 075 012 044 degrees     QTc Int : 447 ms    Normal sinus rhythm  No  previous ECGs available  Confirmed by ANTONINO PARISH MD ([id number]) on 6/5/2017 1:02:38 AM    Referred By:  ED MD           Confirmed By:ANTONINO PARISH MD    ECG 12 Lead [[id number]] Collected:  06/05/17 0248     Updated:  06/05/17 0249            ED Course  ED Course   Value Comment By Time   WBC: (!!) 1.72 (Reviewed) Antonino Parish MD 06/05 0228    CT angiogram of chest per radiology:  IMPRESSION:  No evidence of pulmonary embolus. Lew Miller PA-C 06/05 0237   Troponin I: 0.00 (Reviewed) Antonino Parish MD 06/05 0311    D/W Dr. Leon, admit telemetry. Lew Miller PA-C 06/05 0326                  MDM    Final diagnoses:   Precordial pain   Peripheral artery disease   Dyspnea on exertion   Lupus (systemic lupus erythematosus)   Leukopenia, unspecified type            Lew Miller PA-C  06/05/17 0332

## 2017-06-05 NOTE — PROGRESS NOTES
Discharge Planning Assessment  [facility]     Patient Name: Elham Calderón  MRN: [id number]  Today's Date: 6/5/2017    Admit Date: 6/5/2017          Discharge Needs Assessment       06/05/17 1506    Living Environment    Lives With child(joana), adult    Living Arrangements house   Lives in [facility]    Transportation Available car;family or friend will provide    Living Environment    Provides Primary Care For no one    Quality Of Family Relationships involved;helpful    Able to Return to Prior Living Arrangements yes    Discharge Needs Assessment    Concerns To Be Addressed denies needs/concerns at this time    Anticipated Changes Related to Illness none    Equipment Currently Used at Home none    Equipment Needed After Discharge none    Discharge Disposition still a patient    Discharge Contact Information if Applicable [phone]            Discharge Plan       06/05/17 1507    Case Management/Social Work Plan    Plan Home     Patient/Family In Agreement With Plan yes    Additional Comments Met with pt at . 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 She is independent of ADL's. Has [employer] commercial insurance with script coverage that is affordable. Deneis any needs at this time. CM will follow.         Discharge Placement     No information found        Expected Discharge Date and Time     Expected Discharge Date Expected Discharge Time    Jun 6, 2017               Demographic Summary       06/05/17 1504    Referral Information    Referral Source admission list    Primary Care Physician Information    Name Dr. Michelle Lauren            Functional Status       06/05/17 1505    Functional Status Prior    Ambulation 0-->independent    Transferring 0-->independent    Toileting 0-->independent    Bathing 0-->independent    Dressing 0-->independent    Eating 0-->independent    Communication 0-->understands/communicates without difficulty    Swallowing 0-->swallows foods/liquids without  difficulty    IADL    Medications independent    Meal Preparation independent    Housekeeping independent    Laundry independent    Shopping independent    Oral Care independent    Activity Tolerance    Usual Activity Tolerance good    Current Activity Tolerance good            Psychosocial     None            Abuse/Neglect     None            Legal     None            Substance Abuse     None            Patient Forms     None          Olga Merchant

## 2017-06-05 NOTE — CONSULTS
[facility]  CONSULTATION NOTE      Date of Hospital Visit: 2017  Hospital Day:  LOS: 0 days     IDENTIFICATION:   Patient Name: Elham Calderón     :1965    Primary Care Provider: LAURIE Esparza   Cardiologist: Berlin Dennison MD    Referral Provider: Lynda Leon MD    REASON FOR CONSULTATION: ACS, PAD.    Patient Active Problem List   • Peripheral artery disease; up to 6% stenosis in mid popliteal artery    • Systemic Lupus Erythematosus   • Acute coronary syndrome   • Claudication of left lower extremity     Past Medical History:   Diagnosis Date   • Familial Neutropenia      Past Surgical History:   Procedure Laterality Date   • HYSTERECTOMY       Allergies as of 2017   • (No Known Allergies)     MEDICATIONS:  aspirin 324 mg Oral Once   [START ON 2017] aspirin 81 mg Oral Daily   atorvastatin 80 mg Oral Nightly   carvedilol 3.125 mg Oral BID   [START ON 2017] clopidogrel 75 mg Oral Daily     HISTORY OF PRESENT ILLNESS:   Patient is a 51 year old  female with No significant previous cardiac history.  She is a runner and has lived a very active/healthy lifestyle.  For last 1 year she has experienced off and on chest pain which he kept ignoring thinking that she was otherwise healthy.  Lately she has developed symptoms of severe left leg pain/claudication and since she was getting ready to go on a flight her PCP ordered a venous duplex study which was negative however at the same time her popliteal artery was visualized to show high velocity with suspected 75% left popliteal artery stenosis.  The patient was to be scheduled for a peripheral angiogram and also a cardiac catheterization study for further evaluation of her cardiovascular symptoms however after traveling this weekend she started experiencing significant anterior chest tightness pressure from playing and squeezing like discomfort which was persistent with mild intermittent  relief.  Upon arriving to the [facility] she came straight to [facility] and has been admitted with a suspected diagnosis of unstable angina.  Her symptoms improved with nitroglycerin and heparin.  His also experienced shortness of breath and palpitations.  Denies dizziness lightheadedness or syncope.  No recent fever chills abdominal pain nausea vomiting diarrhea constipation or urinary symptoms.  She has been recently diagnosed with lupus and seizure rheumatologist.  All other review of systems are negative.    She has a strong family history of coronary artery disease.    CARDIAC RISK FACTORS: Family history of heart disease. Father had an MI at age 62. Family   History of peripheral arterial disease.     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   HEENT:  No visual loss, blurred vision, double vision, yellow sclerae.                   No hearing loss, congestion, sore throat.   SKIN:      No rashes, urticaria, ulcers, sores.     RESP:     No shortness of breath, hemoptysis, cough, sputum.   GI:           No anorexia, nausea, vomiting, diarrhea. No abdominal pain, melena.   :         No burning on urination, hematuria or increased frequency.  ENDO:    No diaphoresis, cold or heat intolerance. No polyuria or polydipsia.   NEURO: No headache, dizziness, syncope, paralysis, ataxia, or parasthesias.                  No change in bowel or bladder control. No history of CVA/TIA  MUSC:    No muscle, back pain, joint pain or stiffness.   HEME:    No anemia, bleeding, bruising. No  "history of DVT/PE.  PSYCH:  No history of depression, anxiety.    PHYSICAL EXAM:  Flowsheet Rows     Admission Height  65\" (165.1 cm) Documented at 06/05/2017 0040    Admission Weight  154 lb (69.9 kg) Documented at 06/05/2017 0040     BMI: 26.26 kg/(m^2).     Vital Sign Min/Max for last 24 hours  Temp  Min: 97.8 °F Max: 98 °F    BP  Min: 91/70  Max: 143/86   Pulse  Min: 60  Max: 81   Resp  Min: 16  Max: 20   SpO2  Min: 92 %  Max: 100 %        Gen:   Alert and Oriented. NAD.   Heent:  Normocephalic, Atraumatic. No xanthelasmas   Neck: No JVD. No carotid bruits   Resp:   CTA bilaterally, no wheezes.    Heart:   RRR. No murmurs.   Abd:   Postive bowel sounds.   Ext: No pedal edema bilaterally   Neuro: No focal deficits.   Skin:  No ulcers.   LAB DATA:   Lab Results (last 24 hours)     Lipase [[id number]]  (Abnormal) Collected:  06/05/17 0054     Lipase 56 (H) U/L     BNP [[id number]]  (Normal) Collected:  06/05/17 0054     BNP 5.0 pg/mL     POC Troponin, Rapid [[id number]]  (Normal) Collected:  06/05/17 0252     Troponin I 0.00 ng/mL     Protime-INR [[id number]] Collected:  06/05/17 0054     Protime 9.7 Seconds      INR 0.89    aPTT [[id number]]  (Abnormal) Collected:  06/05/17 0054     PTT 27.5 (L) seconds     ANCA Panel [[id number]] Collected:  06/05/17 0524    C-reactive Protein [[id number]]  (Normal) Collected:  06/05/17 0524     C-Reactive Protein 0.43 mg/dL     Hemoglobin A1c [[id number]]  (Normal) Collected:  06/05/17 0524     HBA1C 4.80 %     Basic Metabolic Panel [[id number]]  (Abnormal) Collected:  06/05/17 0524     Glucose 96 mg/dL      BUN 13 mg/dL      Creatinine 1.00 mg/dL      Sodium 143 mmol/L      Potassium 3.6 mmol/L      Chloride 107 mmol/L      CO2 28.0 mmol/L      Calcium 9.6 mg/dL      eGFR Non African Amer 59 (L) mL/min/1.73      BUN/CR Ratio 13.0     Anion Gap 8.0 mmol/L     Lipid Panel [[id number]]  (Abnormal) Collected:  06/05/17 0524      mg/dL      TRG 29 mg/dL      HDL  68 (H) mg/dL      LDL " 60 mg/dL     Sedimentation Rate [[id number]]  (Normal) Collected:  17     Sed Rate 3 mm/hr     Troponin [[id number]]  (Normal) Collected:  17     Troponin I <0.006 ng/mL     CBC Auto Differential [[id number]]  (Abnormal) Collected:  17     WBC 2.02 (L) 10*3/mm3      RBC 4.51 10*6/mm3      HGB 13.7 g/dL      HCT 40.9 %      MCV 90.7 fL      MCH 30.4 pg      MCHC 33.5 g/dL      RDW 11.7 %      RDW-SD 39.1 fl      MPV 11.4 fL      Platelets 140 (L) 10*3/mm3      DIAGNOSTIC DATA:  EK bpm, NSR. Nonspecific changes    IMAGING:  Imaging Results (last 24 hours)     XR Chest 1 View [[id number]]  (Abnormal) Collected:  17 0040     Updated:  17 0142    Impression:         No acute findings.    CT Angiogram Chest With & Without Contrast [[id number]] Collected:  17 0054     Updated:  17 0236    Impression:         No evidence of pulmonary embolus.     I personally viewed and interpreted patient's EKG, Diagnostics, Labs, and Imaging data.    Assessment/Plan:  Acute Coronary Syndrome  - Patient was already scheduled for LExtremity angiogram and LHC on 17 with Dr. Randy Atkinson.  - Troponin I x 3 negative  - EKG - No acute ST-T wave changes suggestive of ischemia  - NPO  - Heparin gtt; Nitro gtt; ASA 324mg then ASA 81mg.  - Plavix load, then 75 mg daily  - Scheduled for LHC +/- CBI and AA with [facility] today with Dr. Randy Atkinson  - The procedure was explained to the patient/family extensively. Indications, benefits, risks and alternatives were discussed. The patient understands well, and wishes to proceed.     PAD with known Left Lower Extremity Claudication   - Recently diagnosed Left Popliteal artery occlusion.  - Been on Pletal therapy. Holding since admission for now.  - Will perform angiograms at this time of cardiac catheter is in study for further evaluation.    Systemic Lupus Erythematous  - Diagnosed about 6 months ago.  - Rheumatologist: Dr Marcel Hair. Recently  prescribed Plaquenil   - CRP, ESR, INCA, NIKOLAY ordered by Hospitalist.    HLP  - Atorvastatin 80 mg nightly  - LDL 60; HDL 68      Scribed by Amaris Bell PA-C for Dr. Randy Atkinson.  6/5/2017     I have seen and examined the patient, case was discussed with the physician extender, reviewed the above note, necessary changes were made and I agree with the final note.   Randy Atkinson MD, FACC, [facility]

## 2017-06-06 LAB
ANA SER QL: POSITIVE
DSDNA AB SER-ACNC: 40 IU/ML (ref 0–9)
Lab: ABNORMAL

## 2017-06-07 LAB
C-ANCA TITR SER IF: ABNORMAL TITER
MYELOPEROXIDASE AB SER-ACNC: <9 U/ML (ref 0–9)
P-ANCA ATYPICAL TITR SER IF: ABNORMAL TITER
P-ANCA TITR SER IF: ABNORMAL TITER
PROTEINASE3 AB SER IA-ACNC: <3.5 U/ML (ref 0–3.5)

## 2017-06-19 ENCOUNTER — OFFICE VISIT (OUTPATIENT)
Dept: CARDIOLOGY | Facility: CLINIC | Age: 52
End: 2017-06-19

## 2017-06-19 VITALS
DIASTOLIC BLOOD PRESSURE: 77 MMHG | SYSTOLIC BLOOD PRESSURE: 112 MMHG | HEART RATE: 60 BPM | BODY MASS INDEX: 26.56 KG/M2 | HEIGHT: 65 IN | WEIGHT: 159.4 LBS

## 2017-06-19 DIAGNOSIS — I73.9 PAD (PERIPHERAL ARTERY DISEASE) (HCC): Primary | ICD-10-CM

## 2017-06-19 DIAGNOSIS — R07.2 PRECORDIAL PAIN: ICD-10-CM

## 2017-06-19 DIAGNOSIS — M32.9 LUPUS (SYSTEMIC LUPUS ERYTHEMATOSUS) (HCC): ICD-10-CM

## 2017-06-19 DIAGNOSIS — I73.9 CLAUDICATION OF LEFT LOWER EXTREMITY (HCC): ICD-10-CM

## 2017-06-19 PROCEDURE — 99214 OFFICE O/P EST MOD 30 MIN: CPT | Performed by: INTERNAL MEDICINE

## 2017-06-19 RX ORDER — ASPIRIN 325 MG
325 TABLET ORAL DAILY
COMMUNITY
End: 2018-05-25

## 2017-06-19 NOTE — PROGRESS NOTES
LAURIE Esparza  Elham Calderón  1965 06/19/2017    Patient Active Problem List   Diagnosis   • Precordial pain   • Dyspnea on exertion   • PAD (peripheral artery disease) and up to 6% stenosis in the mid popliteal artery   • Lupus (systemic lupus erythematosus)   • Acute coronary syndrome   • Claudication of left lower extremity       Dear LAURIE Esparza:    Subjective         History of Present Illness:Elham Calderón is a 51 y.o. female with the problems as listed above, presentsFor follow-up of recent chest pains.  She underwent the recent data coronary angiography at [facility] that was normal.  She also underwent abdominal aortogram with runoff which revealed a focal concentric stenosis in the  left popliteal artery.  She is being evaluated by the vascular surgery at [facility].  She continues to have progressive left leg claudication.  She also has intermittent chest pain and discomfort.  She had a ventilation perfusion lung scan at [facility] that reportedly was normal.    No Known Allergies:      Current Outpatient Prescriptions:   •  aspirin 325 MG tablet, Take 325 mg by mouth Daily., Disp: , Rfl:   •  estradiol (CLIMARA) 0.0375 MG/24HR, Place 1 patch on the skin 1 (One) Time Per Week. Changes on sunday, Disp: , Rfl:   •  hydroxychloroquine (PLAQUENIL) 200 MG tablet, Take 100 mg by mouth 2 (Two) Times a Day., Disp: , Rfl:   •  atorvastatin (LIPITOR) 10 MG tablet, Take 1 tablet by mouth Daily., Disp: 30 tablet, Rfl: 2  •  cilostazol (PLETAL) 50 MG tablet, Take 1 tablet by mouth 2 (Two) Times a Day., Disp: 60 tablet, Rfl: 2  •  Unable to find, 1 each 1 (One) Time. Med Name: estrodial patch, Disp: , Rfl:       The following portions of the patient's history were reviewed and updated as appropriate: allergies, current medications, past family history, past medical history, past social history, past surgical history and problem list.    Social History  "  Substance Use Topics   • Smoking status: Never Smoker   • Smokeless tobacco: None   • Alcohol use 1.2 oz/week     2 Glasses of wine per week       Review of Systems   Constitution: Negative for chills and fever.   HENT: Negative for headaches, nosebleeds and sore throat.    Cardiovascular: Positive for chest pain and palpitations. Negative for leg swelling.   Respiratory: Negative for cough, hemoptysis and wheezing.    Gastrointestinal: Negative for abdominal pain, hematemesis, hematochezia, melena, nausea and vomiting.   Genitourinary: Negative for dysuria and hematuria.       Objective   Vitals:    06/19/17 0815   BP: 112/77   BP Location: Right arm   Patient Position: Sitting   Cuff Size: Adult   Pulse: 60   Weight: 159 lb 6.4 oz (72.3 kg)   Height: 65\" (165.1 cm)     Body mass index is 26.53 kg/(m^2).        Physical Exam   Constitutional: She is oriented to person, place, and time. She appears well-developed and well-nourished.   HENT:   Head: Normocephalic.   Eyes: Conjunctivae and EOM are normal.   Neck: Normal range of motion. Neck supple. No JVD present. No tracheal deviation present. No thyromegaly present.   Cardiovascular: Normal rate and regular rhythm.  Exam reveals no gallop and no friction rub.    No murmur heard.  Pulses:       Radial pulses are 2+ on the right side, and 2+ on the left side.        Dorsalis pedis pulses are 1+ on the left side.        Posterior tibial pulses are 1+ on the left side.   Pulmonary/Chest: Breath sounds normal. No respiratory distress. She has no wheezes. She has no rales.   Abdominal: Soft. Bowel sounds are normal. She exhibits no mass. There is no tenderness.   Musculoskeletal: She exhibits no edema.   Neurological: She is alert and oriented to person, place, and time. No cranial nerve deficit.   Skin: Skin is warm and dry.   Psychiatric: She has a normal mood and affect.   She has some bruising on the left wrist area extending from the recent arterial puncture site " to FCI up on the forearm.  She has good left radial pulse.       Lab Results   Component Value Date     06/05/2017    K 3.6 06/05/2017     06/05/2017    CO2 28.0 06/05/2017    BUN 13 06/05/2017    CREATININE 1.00 06/05/2017    GLUCOSE 96 06/05/2017    CALCIUM 9.6 06/05/2017    AST 33 06/05/2017    ALT 25 06/05/2017    ALKPHOS 61 06/05/2017    LABIL2 1.6 06/05/2017     No results found for: CKTOTAL  Lab Results   Component Value Date    WBC 2.02 (L) 06/05/2017    HGB 13.7 06/05/2017    HCT 40.9 06/05/2017     (L) 06/05/2017     Lab Results   Component Value Date    INR 0.89 06/05/2017     No results found for: MG  Lab Results   Component Value Date    TRIG 29 06/05/2017    HDL 68 (H) 06/05/2017      Lab Results   Component Value Date    BNP 5.0 06/05/2017     Recent coronary angiography at [facility] revealed:  Angiographic Findings:  Right coronary dominance  · LM: Angiographically normal.  · LAD: Angiographically normal.  · LCX: Angiographically normal.  · RCA: Angiographically normal.  · LV: Left ventriculogram performed in 30 MOSCOSO projection revealed normal global and regional left ventricular systolic function with estimated ejection fraction of 65%. No mitral regurgitation was noted.     Abdominal aortic gram with distal runoff revealed:  Angiographic Findings:  · Abdominal aorta: Angiographically normal. No evidence of significant plaque, stenosis or aneurysm.  · Renal arteries: Angiographically normal bilaterally.  · Iliac arteries: Angiographically normal bilaterally.  · Femorals: Angiographically normal bilaterally.  · Profunda femoral arteries: Normal bilaterally.   · Superficial femoral/Politeal arteries:   ¨ Left : 90% focal stenosis of the left popliteal artery. The superficial femoral is normal.   ¨ Right: Angiographically normal superficial femoral and popliteal.  · Lower legs:  Three vessel runoff bilaterally, no significant obstructive disease is  noted.       Procedures    Assessment/Plan      1. PAD (peripheral artery disease) With about 90% stenosis in the mid popliteal artery     2. Precordial pain , With recent normal coronary angiography, hence probably noncardiac.      3. Claudication of left lower extremity     4. Lupus (systemic lupus erythematosus)          Recommendations:    1.  Continue the low-dose aspirin and pletal as well as atorvastatin for now until the vascular problems is further investigated and resolved.  2. Follow-up with the vascular surgeon at [facility].  3. I have reviewed the coronary angiographic and peripheral arteriographic findings with the patient.  4. We'll see her back in follow-up in 6 months.    Return in about 6 months (around 12/19/2017).    As always, I appreciate very much the opportunity to participate in the cardiovascular care of your patients.    With Best Regards,    Berlin Dennison MD, [facility]    Dragon disclaimer:  Much of this encounter note is an electronic transcription/translation of spoken language to printed text. The electronic translation of spoken language may permit erroneous, or at times, nonsensical words or phrases to be inadvertently transcribed; Although I have reviewed the note for such errors, some may still exist.

## 2017-07-03 ENCOUNTER — HOSPITAL ENCOUNTER (OUTPATIENT)
Dept: CT IMAGING | Facility: HOSPITAL | Age: 52
Discharge: HOME | End: 2017-07-03
Admitting: PHYSICIAN ASSISTANT

## 2017-07-03 ENCOUNTER — OFFICE VISIT (OUTPATIENT)
Dept: CARDIAC SURGERY | Facility: CLINIC | Age: 52
End: 2017-07-03

## 2017-07-03 VITALS
WEIGHT: 158 LBS | BODY MASS INDEX: 26.33 KG/M2 | SYSTOLIC BLOOD PRESSURE: 128 MMHG | TEMPERATURE: 97.8 F | HEIGHT: 65 IN | DIASTOLIC BLOOD PRESSURE: 83 MMHG

## 2017-07-03 DIAGNOSIS — I73.9 PAD (PERIPHERAL ARTERY DISEASE) (HCC): ICD-10-CM

## 2017-07-03 DIAGNOSIS — I73.9 PAD (PERIPHERAL ARTERY DISEASE) (HCC): Primary | ICD-10-CM

## 2017-07-03 PROCEDURE — 73706 CT ANGIO LWR EXTR W/O&W/DYE: CPT

## 2017-07-03 PROCEDURE — 99205 OFFICE O/P NEW HI 60 MIN: CPT | Performed by: THORACIC SURGERY (CARDIOTHORACIC VASCULAR SURGERY)

## 2017-07-03 PROCEDURE — 82565 ASSAY OF CREATININE: CPT

## 2017-07-03 PROCEDURE — 0 IOPAMIDOL PER 1 ML: Performed by: PHYSICIAN ASSISTANT

## 2017-07-03 RX ORDER — FAMOTIDINE 20 MG
TABLET ORAL
COMMUNITY
Start: 2013-03-26 | End: 2018-05-25

## 2017-07-03 RX ORDER — MENTHOL 5.8 MG/1
LOZENGE ORAL
COMMUNITY
Start: 2013-03-28 | End: 2018-05-25

## 2017-07-03 RX ADMIN — IOPAMIDOL 125 ML: 755 INJECTION, SOLUTION INTRAVENOUS at 12:10

## 2017-07-03 NOTE — PROGRESS NOTES
07/03/2017  Patient Information  [place] [address]   1965  'PCP/Referring Physician'  Michelle Lauren, APRN  [phone]  No ref. provider found    Chief Complaint   Patient presents with   • Consult     np per Dr. Atkinson for PAD       History of Present Illness:  This patient was referred to me with quite an unusual vascular problem.  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  She engages in an extremely healthy lifestyle and is very active and often runs marathons.  Within the last few weeks she had increased her running up from 12 up to 20 miles.  She then noticed extreme pain in the left calf muscle and assume she had a muscle tear or injury.  However the pain in the left calf and did not resolve and she stated that she could barely walk 40 feet without severe left calf pain and numbness in the foot.  A subsequent arteriogram demonstrated what appeared to be a focal 98% stenosis in the left popliteal artery possibly just above the flexion joint of the left knee.  This patient is a lifelong nonsmoker and does not have diabetes.  She has however been recently diagnosed with lupus.  It is unclear if this is a traumatic injury with a focal dissection in the artery or the possibility of adventitial cystic disease.  In any event, the patient had seen Dr. Luis Gonzalez and he recommended a below knee bypass with saphenous vein.  The patient wanted to avoid surgery and now sees me for a second opinion.  I have reviewed her arteriogram which shows a focal tight stenosis which may be above the knee flexion.  However in the time lapse since the arteriogram the patient states that she can walk 6 miles before the claudication symptoms occur.  Also, she now has a strong palpable left dorsalis pedis pulse.  At this point I'm reluctant to entertain any intervention in a patient who can walk 6 miles  before tenderness.  However the patient is concerned with this critical stenosis noted on arteriogram if she had an acute thrombosis to put her in emergency situation she can have potential limb loss.  She wonders if catheter-based intervention to the stenosis would be warranted to prevent an acute thrombosis.        Patient Active Problem List   Diagnosis   • Precordial pain   • Dyspnea on exertion   • PAD (peripheral artery disease) and up to 6% stenosis in the mid popliteal artery   • Lupus (systemic lupus erythematosus)   • Acute coronary syndrome   • Claudication of left lower extremity     Past Medical History:   Diagnosis Date   • Familial neutropenia    • Lupus      Past Surgical History:   Procedure Laterality Date   • CARDIAC CATHETERIZATION N/A 6/5/2017    Procedure: Left Heart Cath;  Surgeon: Randy Atkinson MD;  Location: [facility] CATH INVASIVE LOCATION;  Service:    • HYSTERECTOMY         Current Outpatient Prescriptions:   •  aspirin 325 MG tablet, Take 325 mg by mouth Daily., Disp: , Rfl:   •  estradiol (CLIMARA) 0.0375 MG/24HR, Place 1 patch on the skin 1 (One) Time Per Week. Changes on sunday, Disp: , Rfl:   •  Multiple Vitamins-Iron (QC DAILY MULTIVITAMINS/IRON) tablet, Take  by mouth., Disp: , Rfl:   •  Vitamin D, Cholecalciferol, 1000 UNITS capsule, Take  by mouth., Disp: , Rfl:   •  atorvastatin (LIPITOR) 10 MG tablet, Take 1 tablet by mouth Daily., Disp: 30 tablet, Rfl: 2  •  cilostazol (PLETAL) 50 MG tablet, Take 1 tablet by mouth 2 (Two) Times a Day., Disp: 60 tablet, Rfl: 2  •  hydroxychloroquine (PLAQUENIL) 200 MG tablet, Take 100 mg by mouth 2 (Two) Times a Day., Disp: , Rfl:   •  Unable to find, 1 each 1 (One) Time. Med Name: estrodial patch, Disp: , Rfl:   No Known Allergies  Social History     Social History   • Marital status: Single     Spouse name: N/A   • Number of children: N/A   • Years of education: N/A     Occupational History   • Not on file.     Social History Main Topics   •  "Smoking status: Never Smoker   • Smokeless tobacco: Never Used   • Alcohol use 1.2 oz/week     2 Glasses of wine per week   • Drug use: No   • Sexual activity: Not on file     Other Topics Concern   • Not on file     Social History Narrative     Family History   Problem Relation Age of Onset   • Heart disease Father      Review of Systems   Constitution: Negative for chills, fever, malaise/fatigue, night sweats and weight loss.   HENT: Negative for headaches, hearing loss, odynophagia and sore throat.    Cardiovascular: Negative for chest pain, dyspnea on exertion, leg swelling, orthopnea and palpitations.   Respiratory: Negative for cough and hemoptysis.    Endocrine: Negative for cold intolerance, heat intolerance, polydipsia, polyphagia and polyuria.   Hematologic/Lymphatic: Does not bruise/bleed easily.   Skin: Negative for itching and rash.   Musculoskeletal: Negative for joint pain, joint swelling and myalgias.   Gastrointestinal: Negative for abdominal pain, constipation, diarrhea, hematemesis, hematochezia, melena, nausea and vomiting.   Genitourinary: Negative for dysuria, frequency and hematuria.   Neurological: Negative for focal weakness, numbness and seizures.   Psychiatric/Behavioral: Negative for suicidal ideas.   All other systems reviewed and are negative.    Vitals:    07/03/17 0831   BP: 128/83   BP Location: Right arm   Patient Position: Sitting   Temp: 97.8 °F (36.6 °C)   TempSrc: Temporal Artery    Weight: 158 lb (71.7 kg)   Height: 65\" (165.1 cm)      Physical Exam   CONSTITUTIONAL: Alert and conversant, Well dressed, Well nourished, No acute distress  EYES: Sclera clean, Anicteric, Pupils equal  ENT: No nasal deviation, Trachea midline  NECK: No neck masses, Supple  LUNGS: No wheezing, Cough, non-congested  HEART: No rubs, No murmurs  GI: Soft, non-distended, No masses, Non tender  to palpation.  Bowel sounds normal.  NEURO: No motor deficits, No sensory deficits, Cranial Nerves 2 through 12 " grossly intact  PSYCHIATRIC: Oriented to person, place and time, No memory deficits, Mood appropriate  VASCULAR: The left and right dorsalis pedis and posterior tibial pulses are palpable.  There also symmetric to my exam    Labs/Imaging:   I have reviewed the notes from Dr. Luis Gonzalez.  I have reviewed the arteriogram and discuss this with Dr. Atkinson.    Assessment/Plan:   This is an unusual situation.  This is a patient with extremely healthy lifestyle and a marathon runner with a sudden focal stenosis of the left popliteal artery.  I have to think there is a traumatic component to this in this lifelong nonsmoker and patient who is nondiabetic.  Also I wonder if her recently diagnosed lupus could play a role in this.  The injury seems temporally related to her increasing her miles while running.  Interestingly, since her arteriogram has been performed, and in the intervening week or 2 that have passed, the patient states the severe claudication in the left calf which at one point occurred at 40 feet seems to have resolved.  She says now she can walk for 6 miles and play tennis before she starts to experience numbness in the foot and tingling in the calf.  I'm also surprised that I was able to palpate such a strong pulse in the left foot based upon the arteriogram that I reviewed.  I believe that her anatomy could have actually changed since her arteriogram was performed.  At this point I'll have a hard time justifying any type of intervention on the patient who can walk for 6 miles and actively play tennis.  However, I will obtain CT angiogram today to look at the surrounding structures to look for possible pseudocyst and to see if there may be a dissection plane within this artery.  Furthermore, since she has had a formal arteriogram that we know the status of the aorta and iliac vessels as well as the tibial vessels near the ankle, we will focus just on the popliteal artery and try to obtain 1 mm cuts in  this region from mid thigh to mid calf.  She is agreeable to that plan in this highly complex situation.        Patient Active Problem List   Diagnosis   • Precordial pain   • Dyspnea on exertion   • PAD (peripheral artery disease) and up to 6% stenosis in the mid popliteal artery   • Lupus (systemic lupus erythematosus)   • Acute coronary syndrome   • Claudication of left lower extremity     Signed by: Mason Licona M.D.    7/3/2017    CC:  MD Michelle Bradley APRN Debbie Moore, , editing for Mason Licona M.D.    I, Mason Licona MD, have read and agree with the editing done by Elis Mari, .

## 2017-07-04 LAB — CREAT BLDA-MCNC: 0.9 MG/DL (ref 0.6–1.3)

## 2017-08-28 ENCOUNTER — HOSPITAL ENCOUNTER (OUTPATIENT)
Dept: ULTRASOUND IMAGING | Facility: HOSPITAL | Age: 52
Discharge: HOME | End: 2017-08-28
Admitting: NURSE PRACTITIONER

## 2017-08-28 ENCOUNTER — TRANSCRIBE ORDERS (OUTPATIENT)
Dept: ADMINISTRATIVE | Facility: HOSPITAL | Age: 52
End: 2017-08-28

## 2017-08-28 DIAGNOSIS — E04.1 NONTOXIC UNINODULAR GOITER: Primary | ICD-10-CM

## 2017-08-28 DIAGNOSIS — E04.1 NONTOXIC UNINODULAR GOITER: ICD-10-CM

## 2017-08-28 PROCEDURE — 76536 US EXAM OF HEAD AND NECK: CPT

## 2017-08-28 PROCEDURE — 76536 US EXAM OF HEAD AND NECK: CPT | Performed by: RADIOLOGY

## 2018-02-09 ENCOUNTER — HOSPITAL ENCOUNTER (OUTPATIENT)
Dept: GENERAL RADIOLOGY | Facility: HOSPITAL | Age: 53
Discharge: HOME | End: 2018-02-09
Attending: ORTHOPAEDIC SURGERY

## 2018-05-25 ENCOUNTER — OFFICE VISIT (OUTPATIENT)
Dept: OBSTETRICS AND GYNECOLOGY | Facility: CLINIC | Age: 53
End: 2018-05-25

## 2018-05-25 VITALS
BODY MASS INDEX: 26.49 KG/M2 | HEIGHT: 65 IN | DIASTOLIC BLOOD PRESSURE: 70 MMHG | WEIGHT: 159 LBS | SYSTOLIC BLOOD PRESSURE: 124 MMHG

## 2018-05-25 DIAGNOSIS — N89.8 VAGINAL DISCHARGE: ICD-10-CM

## 2018-05-25 DIAGNOSIS — N95.2 ATROPHIC VAGINITIS: Primary | ICD-10-CM

## 2018-05-25 PROCEDURE — 99204 OFFICE O/P NEW MOD 45 MIN: CPT | Performed by: OBSTETRICS & GYNECOLOGY

## 2018-05-25 RX ORDER — ESTRADIOL MICRONIZED 100 %
100 POWDER (GRAM) MISCELLANEOUS DAILY
COMMUNITY
End: 2020-01-08

## 2018-05-25 RX ORDER — ESTRADIOL 0.1 MG/G
CREAM VAGINAL
Qty: 1 EACH | Refills: 12 | Status: SHIPPED | OUTPATIENT
Start: 2018-05-25 | End: 2020-01-08

## 2018-05-25 NOTE — PROGRESS NOTES
Subjective   Chief Complaint   Patient presents with   • Vaginal Discharge     Pt had a brownish, watery discharge 4 days ago, NO itching or irritation. Pt states that she had episode of UTI but treated it with Keflex, States she has not had any more discharge since then     Elham Calderón is a 52 y.o. year old .  No LMP recorded (exact date). Patient has had a hysterectomy.  She presents to be seen because of Sanguinous d/c started 4 days ago and yesterday totaling 3 days.  Patient did not observe any blood.  She not been a previous  reactive before the start of this discharge.  She was out of town and did use a hot tub [place] the [place]./ [place] 15 years, last pap 5 years ago WNL.  NO new partners, off and on UTI--had stopped Estradiol patch but now went back on bioidecticals. Normal MMG last . Normal colonoscopy last year. No urinary culture done.  Patient's been taking Keflex.  Otherwise she relates normal bowel bladder habits.    OTHER COMPLAINTS:  Nothing else    The following portions of the patient's history were reviewed and updated as appropriate:  She  has a past medical history of Familial neutropenia; Gout; Lupus; and Lupus.  She  does not have any pertinent problems on file.  She  has a past surgical history that includes Hysterectomy and Cardiac catheterization (N/A, 2017).  Her family history includes Gout in her father and maternal grandmother; Heart disease in her father; Hypertension in her mother; Osteoarthritis in her maternal grandmother and mother.  She  reports that she has never smoked. She has never used smokeless tobacco. She reports that she drinks about 1.2 oz of alcohol per week . She reports that she does not use drugs.  Current Outpatient Prescriptions   Medication Sig Dispense Refill   • estradiol micronized powder 100 mg Daily.     • Zinc Acetate (GALZIN) 25 MG capsule Every 8 (Eight) Hours.       No current facility-administered medications for this visit.   "    Current Outpatient Prescriptions on File Prior to Visit   Medication Sig   • [DISCONTINUED] aspirin 325 MG tablet Take 325 mg by mouth Daily.   • [DISCONTINUED] atorvastatin (LIPITOR) 10 MG tablet Take 1 tablet by mouth Daily.   • [DISCONTINUED] cilostazol (PLETAL) 50 MG tablet Take 1 tablet by mouth 2 (Two) Times a Day.   • [DISCONTINUED] estradiol (CLIMARA) 0.0375 MG/24HR Place 1 patch on the skin 1 (One) Time Per Week. Changes on sunday   • [DISCONTINUED] hydroxychloroquine (PLAQUENIL) 200 MG tablet Take 100 mg by mouth 2 (Two) Times a Day.   • [DISCONTINUED] Multiple Vitamins-Iron (QC DAILY MULTIVITAMINS/IRON) tablet Take  by mouth.   • [DISCONTINUED] Unable to find 1 each 1 (One) Time. Med Name: estrodial patch   • [DISCONTINUED] Vitamin D, Cholecalciferol, 1000 UNITS capsule Take  by mouth.     No current facility-administered medications on file prior to visit.      She has No Known Allergies.    Smoking status: Never Smoker                                                              Smokeless tobacco: Never Used                        Review of Systems  Consitutional POS: nothing reported    NEG: anorexia or night sweats   Gastointestinal POS: nothing reported    NEG: bloating, change in bowel habits, melena or reflux symptoms   Genitourinary POS: nothing reported    NEG: dysuria or hematuria   Integument POS: nothing reported    NEG: moles that are changing in size, shape, color or rashes   Breast POS: nothing reported    NEG: persistent breast lump, skin dimpling or nipple discharge         Eyes: negative  Ears, nose, mouth, throat, and face: negative  Respiratory: negative  Cardiovascular: negative  Integument/breast: negative  Hematologic/lymphatic: negative  Musculoskeletal:negative  Neurological: negative  Behavioral/Psych: negative  Endocrine: negative  Allergic/Immunologic: negative          Objective   /70   Ht 165.1 cm (65\")   Wt 72.1 kg (159 lb)   LMP  (Exact Date)   BMI 26.46 kg/m² "     General:  well developed; well nourished  no acute distress   Skin:  No suspicious lesions seen   Thyroid: normal to inspection and palpation   Lungs:  breathing is unlabored  clear to auscultation bilaterally   Heart:  Not performed.   Breasts:  Not performed.   Abdomen: soft, non-tender; no masses  no umbilical or inginual hernias are present  no hepato-splenomegaly   Pelvis: Clinical staff was present for exam  External genitalia:  normal appearance of the external genitalia including Bartholin's and Moulton's glands.  :  urethral meatus normal;  Vaginal:  atrophic mucosal changes are present;  Cervix:  absent.  Uterus:  absent.  Adnexa:  non palpable bilaterally.  Rectal:  digital rectal exam not performed; anus visually normal appearing.     Psychiatric: Alert and oriented ×3, mood and affect appropriate  HEENT: Atraumatic, normocephalic, normal scleral icterus  Extremities: 2+ pulses bilaterally, no edema      Lab Review   No data reviewed    Imaging   No data reviewed        Assessment   1. Atrophic vaginitis.  There are no visible lesions or ulcerations noted today.  There is no real significant discharge noted.  There is a loss of rugated dictation and paleness to the vaginal mucosa.     Plan   1. Pap smear done.  Patient is to start Estrace cream 1 mg 3 times a week at night ×3 months, 2 times a week at night ×3 months, then once a week thereafter.  Should symptoms persist or recur despite topical estrogen therapy patient is return to clinic for reexamination.  2.     No orders of the defined types were placed in this encounter.         This note was electronically signed.      May 25, 2018

## 2018-06-05 DIAGNOSIS — N95.2 ATROPHIC VAGINITIS: ICD-10-CM

## 2018-06-05 DIAGNOSIS — N89.8 VAGINAL DISCHARGE: ICD-10-CM

## 2018-10-22 ENCOUNTER — HOSPITAL ENCOUNTER (EMERGENCY)
Facility: HOSPITAL | Age: 53
Discharge: HOME | End: 2018-10-22
Attending: EMERGENCY MEDICINE | Admitting: EMERGENCY MEDICINE

## 2018-10-22 VITALS
OXYGEN SATURATION: 99 % | TEMPERATURE: 98 F | HEART RATE: 75 BPM | DIASTOLIC BLOOD PRESSURE: 77 MMHG | SYSTOLIC BLOOD PRESSURE: 134 MMHG | RESPIRATION RATE: 16 BRPM | BODY MASS INDEX: 24.99 KG/M2 | HEIGHT: 65 IN | WEIGHT: 150 LBS

## 2018-10-22 DIAGNOSIS — F43.0 ACUTE STRESS REACTION: Primary | ICD-10-CM

## 2018-10-22 PROCEDURE — 99283 EMERGENCY DEPT VISIT LOW MDM: CPT

## 2018-10-22 RX ORDER — DIAZEPAM 5 MG/1
5 TABLET ORAL EVERY 6 HOURS PRN
Qty: 6 TABLET | Refills: 0 | Status: SHIPPED | OUTPATIENT
Start: 2018-10-22 | End: 2020-01-08

## 2018-10-22 RX ORDER — IBUPROFEN 400 MG/1
800 TABLET ORAL ONCE
Status: COMPLETED | OUTPATIENT
Start: 2018-10-22 | End: 2018-10-22

## 2018-10-22 RX ADMIN — IBUPROFEN 800 MG: 400 TABLET, FILM COATED ORAL at 19:12
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
## 2020-01-08 ENCOUNTER — OFFICE VISIT (OUTPATIENT)
Dept: PSYCHIATRY | Facility: CLINIC | Age: 55
End: 2020-01-08

## 2020-01-08 VITALS
BODY MASS INDEX: 27.09 KG/M2 | WEIGHT: 162.6 LBS | HEART RATE: 68 BPM | SYSTOLIC BLOOD PRESSURE: 118 MMHG | HEIGHT: 65 IN | DIASTOLIC BLOOD PRESSURE: 60 MMHG

## 2020-01-08 DIAGNOSIS — F90.2 ADHD (ATTENTION DEFICIT HYPERACTIVITY DISORDER), COMBINED TYPE: Primary | ICD-10-CM

## 2020-01-08 DIAGNOSIS — F90.2 ATTENTION DEFICIT HYPERACTIVITY DISORDER (ADHD), COMBINED TYPE: ICD-10-CM

## 2020-01-08 PROCEDURE — 90792 PSYCH DIAG EVAL W/MED SRVCS: CPT | Performed by: NURSE PRACTITIONER

## 2020-01-08 RX ORDER — CREAM BASE NO. 9
CREAM (GRAM) MISCELLANEOUS
COMMUNITY

## 2020-01-08 RX ORDER — PRASTERONE (DHEA) 25 MG
CAPSULE ORAL
COMMUNITY

## 2020-01-08 RX ORDER — ERGOCALCIFEROL 1.25 MG/1
50000 CAPSULE ORAL WEEKLY
COMMUNITY

## 2020-01-08 RX ORDER — METHYLPHENIDATE HYDROCHLORIDE 18 MG/1
18 TABLET, EXTENDED RELEASE ORAL EVERY MORNING
Qty: 30 TABLET | Refills: 0 | Status: SHIPPED | OUTPATIENT
Start: 2020-01-08 | End: 2020-02-18
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
## 2020-02-18 ENCOUNTER — OFFICE VISIT (OUTPATIENT)
Dept: PSYCHIATRY | Facility: CLINIC | Age: 55
End: 2020-02-18

## 2020-02-18 VITALS
SYSTOLIC BLOOD PRESSURE: 122 MMHG | WEIGHT: 164 LBS | DIASTOLIC BLOOD PRESSURE: 74 MMHG | HEIGHT: 65 IN | HEART RATE: 66 BPM | BODY MASS INDEX: 27.32 KG/M2

## 2020-02-18 DIAGNOSIS — F90.2 ADHD (ATTENTION DEFICIT HYPERACTIVITY DISORDER), COMBINED TYPE: Primary | ICD-10-CM

## 2020-02-18 PROCEDURE — 99213 OFFICE O/P EST LOW 20 MIN: CPT | Performed by: NURSE PRACTITIONER

## 2020-02-18 RX ORDER — DEXTROAMPHETAMINE SACCHARATE, AMPHETAMINE ASPARTATE MONOHYDRATE, DEXTROAMPHETAMINE SULFATE AND AMPHETAMINE SULFATE 2.5; 2.5; 2.5; 2.5 MG/1; MG/1; MG/1; MG/1
10 CAPSULE, EXTENDED RELEASE ORAL EVERY MORNING
Qty: 30 CAPSULE | Refills: 0 | Status: SHIPPED | OUTPATIENT
Start: 2020-02-18 | End: 2020-03-20 | Stop reason: SDUPTHER

## 2020-02-18 NOTE — TREATMENT PLAN
Multi-Disciplinary Problems (from Behavioral Health Treatment Plan)    Active Problems     Problem: ADHD PEDS  Start Date: 02/18/20    Problem Details:  The patient self-scales this problem as a 3 with 10 being the worst.     Goal Priority Start Date Expected End Date End Date    Patient will sustain attention and concentration to complete school assignments, chores and work responsibilities and demonstrate improved behaviors. -- 02/18/20 02/18/20 --    Goal Details:  Progress toward goal:  The patient self-scales their progress related to this goal as a 3 with 10 being the worst.     Goal Intervention Frequency Start Date End Date    Assist patient in setting responsible goals and limits in behavior PRN 02/19/20 03/19/20    Intervention Details:  Pt will be able to complete tasks in a timely manner                        I have discussed and reviewed this treatment plan with the patient.

## 2020-02-18 NOTE — PROGRESS NOTES
"      Susana Calderón is a 54 y.o. female is here today for medication management follow-up.    Chief Complaint:  Recheck on ADHD    History of Present Illness:  Pt presents for follow up.  She states that the Concerta did not seem to help much.  She states that she had explained the situation to her  and the  could not tell that it helped with her organization and completing tasks.  She states that maybe it helped \"10%\".  She denies any depression or anxiety.  Says the medication actually seemed to make her drowsy at first when she first started taking it.  Mood is stable.  No symptoms of seun.  States that she just cannot focus and concentrate and cannot complete large projects.  She has tried Strattera in the past and it did not help.  She states approximately 10 years ago she tried Wellbutrin and states \"I did not like the way it made me feel\" Body mass index is 27.29 kg/m². weight gain 2 lbs since last visit.  No medical issues.      The following portions of the patient's history were reviewed and updated as appropriate: allergies, current medications, past family history, past medical history, past social history, past surgical history and problem list.    Review of Systems   Constitutional: Negative for activity change, appetite change and fatigue.   HENT: Negative.    Eyes: Negative for visual disturbance.   Respiratory: Negative.    Cardiovascular: Negative.    Gastrointestinal: Negative for nausea.   Endocrine: Negative.    Genitourinary: Negative.    Musculoskeletal: Negative for arthralgias.   Skin: Negative.    Allergic/Immunologic: Negative.    Neurological: Negative for dizziness, seizures and headaches.   Hematological: Negative.    Psychiatric/Behavioral: Positive for decreased concentration. Negative for agitation, behavioral problems, confusion, dysphoric mood, hallucinations, self-injury, sleep disturbance and suicidal ideas. The patient is not " "nervous/anxious and is not hyperactive.        Objective   Physical Exam   Constitutional: She is oriented to person, place, and time. She appears well-developed and well-nourished.   HENT:   Head: Normocephalic.   Neck: Normal range of motion.   Neurological: She is alert and oriented to person, place, and time.   Psychiatric: She has a normal mood and affect. Her speech is normal and behavior is normal. Judgment and thought content normal. Cognition and memory are normal.   Pleasant and cooperative   Vitals reviewed.    Blood pressure 122/74, pulse 66, height 165.1 cm (65\"), weight 74.4 kg (164 lb).    Medication List:   Current Outpatient Medications   Medication Sig Dispense Refill   • DHEA 25 MG capsule Take  by mouth.     • Hormone Cream Base (HRT BASE) cream      • vitamin D (ERGOCALCIFEROL) 1.25 MG (84567 UT) capsule capsule Take 50,000 Units by mouth 1 (One) Time Per Week.     • amphetamine-dextroamphetamine XR (ADDERALL XR) 10 MG 24 hr capsule Take 1 capsule by mouth Every Morning 30 capsule 0     No current facility-administered medications for this visit.        Mental Status Exam:   Hygiene:   good  Cooperation:  Cooperative  Eye Contact:  Good  Psychomotor Behavior:  Appropriate  Affect:  Full range  Hopelessness: Denies  Speech:  Normal  Thought Process:  Goal directed  Thought Content:  Normal  Suicidal:  None  Homicidal:  None  Hallucinations:  None  Delusion:  None  Memory:  Intact  Orientation:  Person, Place, Time and Situation  Reliability:  good  Insight:  Good  Judgement:  Good  Impulse Control:  Good  Physical/Medical Issues:  No     Assessment/Plan   Problems Addressed this Visit     None      Visit Diagnoses     ADHD (attention deficit hyperactivity disorder), combined type    -  Primary    Relevant Medications    amphetamine-dextroamphetamine XR (ADDERALL XR) 10 MG 24 hr capsule        Functionality: pt having significant impairment in important areas of daily functioning.  Prognosis: Good " dependent on medication/follow up and treatment plan compliance.      Discussed medication options.  We discussed options and that would be possibly trying clonidine however patient was very reluctant as she is very familiar with this medication and its side effects and did not think that she could handle the side effect profile.  We discussed reintroducing Wellbutrin and starting at a very low dose and titrating up.  I also discussed switching the stimulant just to see if a different one would work any better.  I am changing her to adderall  XR.  If this 1 does not work we will switch to a Wellbutrin and start a low dosing and get up to a target of 300 XL.  She is going to let me know how the Adderall works and the length of time if it does.  Reviewed the risks, benefits, and side effects of the medications; patient acknowledged and verbally consented.  Patient is agreeable to call the [facility].  Patient is aware to call 911 or go to the nearest ER should begin having SI/HI.   Turn to clinic in 8 weeks.             This document has been electronically signed by LAURIE Swain on   February 18, 2020 @NOW@.

## 2020-03-20 DIAGNOSIS — F90.2 ADHD (ATTENTION DEFICIT HYPERACTIVITY DISORDER), COMBINED TYPE: ICD-10-CM

## 2020-03-20 RX ORDER — DEXTROAMPHETAMINE SACCHARATE, AMPHETAMINE ASPARTATE MONOHYDRATE, DEXTROAMPHETAMINE SULFATE AND AMPHETAMINE SULFATE 2.5; 2.5; 2.5; 2.5 MG/1; MG/1; MG/1; MG/1
10 CAPSULE, EXTENDED RELEASE ORAL EVERY MORNING
Qty: 30 CAPSULE | Refills: 0 | Status: SHIPPED | OUTPATIENT
Start: 2020-03-20 | End: 2020-05-19 | Stop reason: SDUPTHER

## 2020-05-19 ENCOUNTER — OFFICE VISIT (OUTPATIENT)
Dept: PSYCHIATRY | Facility: CLINIC | Age: 55
End: 2020-05-19

## 2020-05-19 DIAGNOSIS — F90.2 ADHD (ATTENTION DEFICIT HYPERACTIVITY DISORDER), COMBINED TYPE: Primary | ICD-10-CM

## 2020-05-19 PROCEDURE — 99441 PR PHYS/QHP TELEPHONE EVALUATION 5-10 MIN: CPT | Performed by: NURSE PRACTITIONER

## 2020-05-19 RX ORDER — DEXTROAMPHETAMINE SACCHARATE, AMPHETAMINE ASPARTATE MONOHYDRATE, DEXTROAMPHETAMINE SULFATE AND AMPHETAMINE SULFATE 2.5; 2.5; 2.5; 2.5 MG/1; MG/1; MG/1; MG/1
10 CAPSULE, EXTENDED RELEASE ORAL EVERY MORNING
Qty: 30 CAPSULE | Refills: 0 | Status: SHIPPED | OUTPATIENT
Start: 2020-05-19 | End: 2020-06-22 | Stop reason: SDUPTHER

## 2020-05-19 NOTE — PROGRESS NOTES
This is a telephone visit due to covid 19 pandemic.  She agrees for the interview to take place.      CC:  Recheck on ADHD    That she is doing really well.  She states that she is completing her charting etc. at work and it has made a huge difference.  States that she has motivation and energy to do things.  She denies any negative side effects to the medication.  Denies any depression.  She is sleeping well at night without difficulty.  Mood is stable.  No medical stressors.  She is very pleased with the results of the medication.    Treatment plan: She is to continue the Adderall at current dosing.  She does not need any type of therapy at this time.  I will have her return to clinic in 3 months.  She will notify me sooner should problems develop or issues arise.    This visit has been rescheduled as a phone visit to comply with patient safety concerns in accordance with CDC recommendations. Total time of discussion was 10 minutes.

## 2020-06-22 ENCOUNTER — TELEPHONE (OUTPATIENT)
Dept: PSYCHIATRY | Facility: CLINIC | Age: 55
End: 2020-06-22

## 2020-06-22 DIAGNOSIS — F90.2 ADHD (ATTENTION DEFICIT HYPERACTIVITY DISORDER), COMBINED TYPE: ICD-10-CM

## 2020-06-22 RX ORDER — DEXTROAMPHETAMINE SACCHARATE, AMPHETAMINE ASPARTATE MONOHYDRATE, DEXTROAMPHETAMINE SULFATE AND AMPHETAMINE SULFATE 2.5; 2.5; 2.5; 2.5 MG/1; MG/1; MG/1; MG/1
10 CAPSULE, EXTENDED RELEASE ORAL EVERY MORNING
Qty: 30 CAPSULE | Refills: 0 | Status: SHIPPED | OUTPATIENT
Start: 2020-06-22 | End: 2020-08-19 | Stop reason: SDUPTHER

## 2020-08-19 ENCOUNTER — OFFICE VISIT (OUTPATIENT)
Dept: PSYCHIATRY | Facility: CLINIC | Age: 55
End: 2020-08-19

## 2020-08-19 DIAGNOSIS — F90.2 ADHD (ATTENTION DEFICIT HYPERACTIVITY DISORDER), COMBINED TYPE: ICD-10-CM

## 2020-08-19 PROCEDURE — 99441 PR PHYS/QHP TELEPHONE EVALUATION 5-10 MIN: CPT | Performed by: NURSE PRACTITIONER

## 2020-08-19 RX ORDER — DEXTROAMPHETAMINE SACCHARATE, AMPHETAMINE ASPARTATE MONOHYDRATE, DEXTROAMPHETAMINE SULFATE AND AMPHETAMINE SULFATE 2.5; 2.5; 2.5; 2.5 MG/1; MG/1; MG/1; MG/1
10 CAPSULE, EXTENDED RELEASE ORAL EVERY MORNING
Qty: 30 CAPSULE | Refills: 0 | Status: SHIPPED | OUTPATIENT
Start: 2020-08-19 | End: 2020-11-19 | Stop reason: SDUPTHER

## 2020-08-19 NOTE — PROGRESS NOTES
This is a telephone follow up due to the covid 19 pandemic.  Pt gives permission to speak over the phone.      CC:  Recheck on ADHD  She states that she is doing well on the current dose of Adderall.  There are a few days that she does not take the medication on the weekends occasionally.  She denies any current depression or anxiety.  Is very active and going on trips with physical activity and enjoying things.  Sleeping well at night without difficulty.  No negative side effects to the medication.  No current medical stressors.    Plan: She is to continue the Adderall at current dosing.  I will have her return to clinic in 3 months for a regular visit.  She will contact me should any problems develop.    This visit has been rescheduled as a phone visit to comply with patient safety concerns in accordance with CDC recommendations. Total time of discussion was 10 minutes.

## 2020-11-19 ENCOUNTER — OFFICE VISIT (OUTPATIENT)
Dept: PSYCHIATRY | Facility: CLINIC | Age: 55
End: 2020-11-19

## 2020-11-19 DIAGNOSIS — F90.2 ADHD (ATTENTION DEFICIT HYPERACTIVITY DISORDER), COMBINED TYPE: Primary | ICD-10-CM

## 2020-11-19 PROCEDURE — 99441 PR PHYS/QHP TELEPHONE EVALUATION 5-10 MIN: CPT | Performed by: NURSE PRACTITIONER

## 2020-11-19 RX ORDER — DEXTROAMPHETAMINE SACCHARATE, AMPHETAMINE ASPARTATE MONOHYDRATE, DEXTROAMPHETAMINE SULFATE AND AMPHETAMINE SULFATE 2.5; 2.5; 2.5; 2.5 MG/1; MG/1; MG/1; MG/1
10 CAPSULE, EXTENDED RELEASE ORAL EVERY MORNING
Qty: 30 CAPSULE | Refills: 0 | Status: SHIPPED | OUTPATIENT
Start: 2020-11-19 | End: 2021-01-19 | Stop reason: SDUPTHER

## 2020-11-19 NOTE — PROGRESS NOTES
This is a telephone follow up due to the covid 19 pandemic.  Pt gives permission to speak over the phone.      CC:  Recheck on ADHD    She has had COVID-19 since I saw her last.  She has recovered.  She states that she is doing well.  The medication has really helped change her life and help her get things organized.  She currently denies any anxiety or depression.  No negative side effects to the medication.  Sleeping well at night without difficulty.  Mood is stable.  Pleased with progress.      Plan: She is to continue the Adderall at current dosing.  I will have her return to clinic in 4 months for a regular visit.  She will contact me should any problems develop.    This visit has been rescheduled as a phone visit to comply with patient safety concerns in accordance with CDC recommendations. Total time of discussion was 10 minutes.

## 2021-01-19 DIAGNOSIS — F90.2 ADHD (ATTENTION DEFICIT HYPERACTIVITY DISORDER), COMBINED TYPE: ICD-10-CM

## 2021-01-19 RX ORDER — DEXTROAMPHETAMINE SACCHARATE, AMPHETAMINE ASPARTATE MONOHYDRATE, DEXTROAMPHETAMINE SULFATE AND AMPHETAMINE SULFATE 2.5; 2.5; 2.5; 2.5 MG/1; MG/1; MG/1; MG/1
10 CAPSULE, EXTENDED RELEASE ORAL EVERY MORNING
Qty: 30 CAPSULE | Refills: 0 | Status: CANCELLED | OUTPATIENT
Start: 2021-01-19

## 2021-01-19 RX ORDER — DEXTROAMPHETAMINE SACCHARATE, AMPHETAMINE ASPARTATE MONOHYDRATE, DEXTROAMPHETAMINE SULFATE AND AMPHETAMINE SULFATE 2.5; 2.5; 2.5; 2.5 MG/1; MG/1; MG/1; MG/1
10 CAPSULE, EXTENDED RELEASE ORAL EVERY MORNING
Qty: 30 CAPSULE | Refills: 0 | Status: SHIPPED | OUTPATIENT
Start: 2021-01-19 | End: 2021-03-18 | Stop reason: SDUPTHER

## 2021-03-18 ENCOUNTER — OFFICE VISIT (OUTPATIENT)
Dept: PSYCHIATRY | Facility: CLINIC | Age: 56
End: 2021-03-18

## 2021-03-18 VITALS
BODY MASS INDEX: 26.46 KG/M2 | DIASTOLIC BLOOD PRESSURE: 78 MMHG | TEMPERATURE: 97.4 F | HEIGHT: 64 IN | HEART RATE: 73 BPM | WEIGHT: 155 LBS | SYSTOLIC BLOOD PRESSURE: 122 MMHG

## 2021-03-18 DIAGNOSIS — F90.2 ADHD (ATTENTION DEFICIT HYPERACTIVITY DISORDER), COMBINED TYPE: Primary | ICD-10-CM

## 2021-03-18 PROCEDURE — 99213 OFFICE O/P EST LOW 20 MIN: CPT | Performed by: NURSE PRACTITIONER

## 2021-03-18 RX ORDER — DEXTROAMPHETAMINE SACCHARATE, AMPHETAMINE ASPARTATE MONOHYDRATE, DEXTROAMPHETAMINE SULFATE AND AMPHETAMINE SULFATE 2.5; 2.5; 2.5; 2.5 MG/1; MG/1; MG/1; MG/1
10 CAPSULE, EXTENDED RELEASE ORAL EVERY MORNING
Qty: 30 CAPSULE | Refills: 0 | Status: SHIPPED | OUTPATIENT
Start: 2021-03-18 | End: 2021-04-19 | Stop reason: SDUPTHER

## 2021-03-18 NOTE — PROGRESS NOTES
Susana Calderón is a 55 y.o. female is here today for medication management follow-up.    Chief Complaint:  Recheck on ADHD    History of Present Illness:  Pt presents for follow up. Pt states she is doing well.  The medication continues to help her with focus and concentration.  She is able to get projects done.  She denies any depression or anxiety.  No medical stressors.  No negative side effects to the meds.  Body mass index is 26.61 kg/m². no appetite changes.  Mood is stable.  Continues to be pleased with meds.      The following portions of the patient's history were reviewed and updated as appropriate: allergies, current medications, past family history, past medical history, past social history, past surgical history and problem list.    Review of Systems   Constitutional: Negative for activity change, appetite change and fatigue.   HENT: Negative.    Eyes: Negative for visual disturbance.   Respiratory: Negative.    Cardiovascular: Negative.    Gastrointestinal: Negative for nausea.   Endocrine: Negative.    Genitourinary: Negative.    Musculoskeletal: Negative for arthralgias.   Skin: Negative.    Allergic/Immunologic: Negative.    Neurological: Negative for dizziness, seizures and headaches.   Hematological: Negative.    Psychiatric/Behavioral: Negative for agitation, behavioral problems, confusion, decreased concentration, dysphoric mood, hallucinations, self-injury, sleep disturbance and suicidal ideas. The patient is not nervous/anxious and is not hyperactive.        Objective   Physical Exam   Constitutional: She is oriented to person, place, and time. She appears well-developed.   HENT:   Head: Normocephalic.   Neurological: She is alert and oriented to person, place, and time.   Psychiatric: Her speech is normal and behavior is normal. Judgment and thought content normal.   Pleasant and cooperative   Vitals reviewed.    Blood pressure 122/78, pulse 73, temperature 97.4 °F (36.3  "°C), height 162.6 cm (64\"), weight 70.3 kg (155 lb).    Medication List:   Current Outpatient Medications   Medication Sig Dispense Refill   • amphetamine-dextroamphetamine XR (ADDERALL XR) 10 MG 24 hr capsule Take 1 capsule by mouth Every Morning 30 capsule 0   • DHEA 25 MG capsule Take  by mouth.     • Hormone Cream Base (HRT BASE) cream      • vitamin D (ERGOCALCIFEROL) 1.25 MG (37419 UT) capsule capsule Take 50,000 Units by mouth 1 (One) Time Per Week.       No current facility-administered medications for this visit.       Mental Status Exam:   Hygiene:   good  Cooperation:  Cooperative  Eye Contact:  Good  Psychomotor Behavior:  Appropriate  Affect:  Full range  Hopelessness: Denies  Speech:  Normal  Thought Process:  Goal directed  Thought Content:  Normal  Suicidal:  None  Homicidal:  None  Hallucinations:  None  Delusion:  None  Memory:  Intact  Orientation:  Person, Place, Time and Situation  Reliability:  good  Insight:  Good  Judgement:  Good  Impulse Control:  Good  Physical/Medical Issues:  No     Assessment/Plan   Problems Addressed this Visit     None      Visit Diagnoses     ADHD (attention deficit hyperactivity disorder), combined type    -  Primary    Relevant Medications    amphetamine-dextroamphetamine XR (ADDERALL XR) 10 MG 24 hr capsule      Diagnoses       Codes Comments    ADHD (attention deficit hyperactivity disorder), combined type    -  Primary ICD-10-CM: F90.2  ICD-9-CM: 314.01         Functionality: pt having minimal impairment in important areas of daily functioning.  Prognosis: Good dependent on medication/follow up and treatment plan compliance.  UDS obtained today and pending.  stephanie reviewed.       Depression: Not at risk   • PHQ-2 Score: 0         She is to continue the adderall for the adhd.  No medication adjustment needed at this time.  Continuing efforts to promote the therapeutic alliance, address the patient's issues, and strengthen self awareness, insights, and coping " skills  .  Patient is agreeable to call the  Clinic with worsening symptoms.  .  Patient is aware to call 911 or go to the nearest ER should begin having SI/HI.   Turn to clinic in 12-16  weeks.             This document has been electronically signed by LAURIE Swain on   March 18, 2021 @NOW@.

## 2021-04-19 DIAGNOSIS — F90.2 ADHD (ATTENTION DEFICIT HYPERACTIVITY DISORDER), COMBINED TYPE: ICD-10-CM

## 2021-04-19 RX ORDER — DEXTROAMPHETAMINE SACCHARATE, AMPHETAMINE ASPARTATE MONOHYDRATE, DEXTROAMPHETAMINE SULFATE AND AMPHETAMINE SULFATE 2.5; 2.5; 2.5; 2.5 MG/1; MG/1; MG/1; MG/1
10 CAPSULE, EXTENDED RELEASE ORAL EVERY MORNING
Qty: 30 CAPSULE | Refills: 0 | Status: SHIPPED | OUTPATIENT
Start: 2021-04-19 | End: 2021-06-21 | Stop reason: SDUPTHER

## 2021-05-12 ENCOUNTER — TRANSCRIBE ORDERS (OUTPATIENT)
Dept: ADMINISTRATIVE | Facility: HOSPITAL | Age: 56
End: 2021-05-12

## 2021-05-12 DIAGNOSIS — N20.0 KIDNEY STONE: Primary | ICD-10-CM

## 2021-05-13 ENCOUNTER — APPOINTMENT (OUTPATIENT)
Dept: CT IMAGING | Facility: HOSPITAL | Age: 56
End: 2021-05-13

## 2021-05-14 ENCOUNTER — IMMUNIZATION (OUTPATIENT)
Dept: VACCINE CLINIC | Facility: HOSPITAL | Age: 56
End: 2021-05-14

## 2021-05-14 PROCEDURE — 0001A: CPT | Performed by: INTERNAL MEDICINE

## 2021-05-14 PROCEDURE — 91300 HC SARSCOV02 VAC 30MCG/0.3ML IM: CPT | Performed by: INTERNAL MEDICINE

## 2021-06-21 ENCOUNTER — TELEMEDICINE (OUTPATIENT)
Dept: PSYCHIATRY | Facility: CLINIC | Age: 56
End: 2021-06-21

## 2021-06-21 DIAGNOSIS — F90.2 ADHD (ATTENTION DEFICIT HYPERACTIVITY DISORDER), COMBINED TYPE: Primary | ICD-10-CM

## 2021-06-21 PROCEDURE — 99213 OFFICE O/P EST LOW 20 MIN: CPT | Performed by: NURSE PRACTITIONER

## 2021-06-21 RX ORDER — DEXTROAMPHETAMINE SACCHARATE, AMPHETAMINE ASPARTATE MONOHYDRATE, DEXTROAMPHETAMINE SULFATE AND AMPHETAMINE SULFATE 2.5; 2.5; 2.5; 2.5 MG/1; MG/1; MG/1; MG/1
10 CAPSULE, EXTENDED RELEASE ORAL EVERY MORNING
Qty: 30 CAPSULE | Refills: 0 | Status: SHIPPED | OUTPATIENT
Start: 2021-06-21 | End: 2021-09-03 | Stop reason: SDUPTHER

## 2021-06-21 NOTE — PROGRESS NOTES
Subjective   Patient ID: Elham Calderón is a 55 y.o. female Pt is being seen today via telemed through My Chart.  Provider is located at the office.      CC;  Recheck on ADHD  History of Present Illness   Pt says she continues to do well.  Taking the stimulant aprox 3-4 times a week when she works.  No negative side effects.  Sleeping well.  Mood is stable.  Denies any depression or anxiety.  Continues to be pleased.  No medical stressors.      The following portions of the patient's history were reviewed and updated as appropriate: allergies, current medications, past family history, past medical history, past social history, past surgical history and problem list.    Review of Systems   Constitutional: Negative for activity change, appetite change and fatigue.   HENT: Negative.    Eyes: Negative for visual disturbance.   Respiratory: Negative.    Cardiovascular: Negative.    Gastrointestinal: Negative for nausea.   Endocrine: Negative.    Genitourinary: Negative.    Musculoskeletal: Negative for arthralgias.   Skin: Negative.    Allergic/Immunologic: Negative.    Neurological: Negative for dizziness, seizures and headaches.   Hematological: Negative.    Psychiatric/Behavioral: Negative for agitation, behavioral problems, confusion, decreased concentration, dysphoric mood, hallucinations, self-injury, sleep disturbance and suicidal ideas. The patient is not nervous/anxious and is not hyperactive.        Objective   Mental Status Exam  Appearance:  clean and casually dressed, appropriate  Attitude toward clinician:  cooperative and agreeable   Speech:    Rate:  regular rate and rhythm   Volume:  normal  Motor:  no abnormal movements present  Mood:  wnl  Affect:  euthymic  Thought Processes:  linear, logical, and goal directed  Thought Content:  normal  Suicidal Thoughts:  absent  Homicidal Thoughts:  absent  Perceptual Disturbance: no perceptual disturbance  Attention and Concentration:  good  Insight and Judgement:   good  Memory:  memory appears to be intact  Physical Exam  Constitutional:       Appearance: Normal appearance.   Musculoskeletal:      Cervical back: Normal range of motion.   Neurological:      General: No focal deficit present.      Mental Status: She is alert and oriented to person, place, and time.   Psychiatric:         Attention and Perception: Attention normal.         Mood and Affect: Mood normal.         Speech: Speech normal.         Behavior: Behavior normal.         Thought Content: Thought content normal.         Cognition and Memory: Cognition normal.         Judgment: Judgment normal.      Comments: Pleasant and cooperative       Lab Review:   not applicable  Assessment/Plan   Diagnoses and all orders for this visit:    1. ADHD (attention deficit hyperactivity disorder), combined type (Primary)  -     amphetamine-dextroamphetamine XR (ADDERALL XR) 10 MG 24 hr capsule; Take 1 capsule by mouth Every Morning  Dispense: 30 capsule; Refill: 0    She is to continue the Adderall.  Roderick reviewed.  Previous UDS in the past reviewed and appropriate.  Refill has been submitted.  She will follow-up in 3 months for a telehealth visit and then in 6 months for an in person visit.  She will let me know should any problems develop I will see her sooner.  No follow-ups on file.

## 2021-09-03 DIAGNOSIS — F90.2 ADHD (ATTENTION DEFICIT HYPERACTIVITY DISORDER), COMBINED TYPE: ICD-10-CM

## 2021-09-03 RX ORDER — DEXTROAMPHETAMINE SACCHARATE, AMPHETAMINE ASPARTATE MONOHYDRATE, DEXTROAMPHETAMINE SULFATE AND AMPHETAMINE SULFATE 2.5; 2.5; 2.5; 2.5 MG/1; MG/1; MG/1; MG/1
10 CAPSULE, EXTENDED RELEASE ORAL EVERY MORNING
Qty: 30 CAPSULE | Refills: 0 | Status: SHIPPED | OUTPATIENT
Start: 2021-09-03 | End: 2021-11-17 | Stop reason: SDUPTHER

## 2021-11-17 DIAGNOSIS — F90.2 ADHD (ATTENTION DEFICIT HYPERACTIVITY DISORDER), COMBINED TYPE: ICD-10-CM

## 2021-11-18 RX ORDER — DEXTROAMPHETAMINE SACCHARATE, AMPHETAMINE ASPARTATE MONOHYDRATE, DEXTROAMPHETAMINE SULFATE AND AMPHETAMINE SULFATE 2.5; 2.5; 2.5; 2.5 MG/1; MG/1; MG/1; MG/1
10 CAPSULE, EXTENDED RELEASE ORAL EVERY MORNING
Qty: 30 CAPSULE | Refills: 0 | Status: SHIPPED | OUTPATIENT
Start: 2021-11-18 | End: 2022-01-12 | Stop reason: SDUPTHER

## 2022-01-12 ENCOUNTER — OFFICE VISIT (OUTPATIENT)
Dept: PSYCHIATRY | Facility: CLINIC | Age: 57
End: 2022-01-12

## 2022-01-12 VITALS
DIASTOLIC BLOOD PRESSURE: 78 MMHG | WEIGHT: 162.8 LBS | OXYGEN SATURATION: 99 % | SYSTOLIC BLOOD PRESSURE: 125 MMHG | HEIGHT: 64 IN | HEART RATE: 71 BPM | BODY MASS INDEX: 27.79 KG/M2 | TEMPERATURE: 97.1 F

## 2022-01-12 DIAGNOSIS — F90.2 ADHD (ATTENTION DEFICIT HYPERACTIVITY DISORDER), COMBINED TYPE: Primary | ICD-10-CM

## 2022-01-12 PROCEDURE — 99213 OFFICE O/P EST LOW 20 MIN: CPT | Performed by: NURSE PRACTITIONER

## 2022-01-12 RX ORDER — DEXTROAMPHETAMINE SACCHARATE, AMPHETAMINE ASPARTATE MONOHYDRATE, DEXTROAMPHETAMINE SULFATE AND AMPHETAMINE SULFATE 2.5; 2.5; 2.5; 2.5 MG/1; MG/1; MG/1; MG/1
10 CAPSULE, EXTENDED RELEASE ORAL EVERY MORNING
Qty: 30 CAPSULE | Refills: 0 | Status: SHIPPED | OUTPATIENT
Start: 2022-01-12 | End: 2022-04-13 | Stop reason: SDUPTHER

## 2022-01-12 NOTE — PROGRESS NOTES
Subjective   Elham Calderón is a 56 y.o. female is here today for medication management follow-up.    Chief Complaint:  Recheck on ADHD    History of Present Illness:  Pt presents for follow up. Pt states she is doing well.  The medication continues to help her with focus and concentration.  She only takes the med aprox 3 times a week.  Says that is adequate for her to complete things at work.  She denies any depression or anxiety.  No medical stressors.  No negative side effects to the meds. Body mass index is 27.94 kg/m². no appetite changes.  Mood is stable.  Continues to be pleased with meds.      The following portions of the patient's history were reviewed and updated as appropriate: allergies, current medications, past family history, past medical history, past social history, past surgical history and problem list.    Review of Systems   Constitutional: Negative for activity change, appetite change and fatigue.   HENT: Negative.    Eyes: Negative for visual disturbance.   Respiratory: Negative.    Cardiovascular: Negative.    Gastrointestinal: Negative for nausea.   Endocrine: Negative.    Genitourinary: Negative.    Musculoskeletal: Negative for arthralgias.   Skin: Negative.    Allergic/Immunologic: Negative.    Neurological: Negative for dizziness, seizures and headaches.   Hematological: Negative.    Psychiatric/Behavioral: Negative for agitation, behavioral problems, confusion, decreased concentration, dysphoric mood, hallucinations, self-injury, sleep disturbance and suicidal ideas. The patient is not nervous/anxious and is not hyperactive.      Reviewed copied data and there are no changes    Objective   Physical Exam   Constitutional: She is oriented to person, place, and time. She appears well-developed.   HENT:   Head: Normocephalic.   Neurological: She is alert and oriented to person, place, and time.   Psychiatric: Her speech is normal and behavior is normal. Judgment and thought content  "normal.   Pleasant and cooperative   Vitals reviewed.    Blood pressure 125/78, pulse 71, temperature 97.1 °F (36.2 °C), height 162.6 cm (64\"), weight 73.8 kg (162 lb 12.8 oz), SpO2 99 %.    Medication List:   Current Outpatient Medications   Medication Sig Dispense Refill   • amphetamine-dextroamphetamine XR (ADDERALL XR) 10 MG 24 hr capsule Take 1 capsule by mouth Every Morning 30 capsule 0   • DHEA 25 MG capsule Take  by mouth.     • Hormone Cream Base (HRT BASE) cream      • vitamin D (ERGOCALCIFEROL) 1.25 MG (67685 UT) capsule capsule Take 50,000 Units by mouth 1 (One) Time Per Week.       No current facility-administered medications for this visit.     Reviewed copied data and there are no changes    Mental Status Exam:   Hygiene:   good  Cooperation:  Cooperative  Eye Contact:  Good  Psychomotor Behavior:  Appropriate  Affect:  Full range  Hopelessness: Denies  Speech:  Normal  Thought Process:  Goal directed  Thought Content:  Normal  Suicidal:  None  Homicidal:  None  Hallucinations:  None  Delusion:  None  Memory:  Intact  Orientation:  Person, Place, Time and Situation  Reliability:  good  Insight:  Good  Judgement:  Good  Impulse Control:  Good  Physical/Medical Issues:  No     Assessment/Plan   Problems Addressed this Visit     None      Visit Diagnoses     ADHD (attention deficit hyperactivity disorder), combined type    -  Primary    Relevant Medications    amphetamine-dextroamphetamine XR (ADDERALL XR) 10 MG 24 hr capsule      Diagnoses       Codes Comments    ADHD (attention deficit hyperactivity disorder), combined type    -  Primary ICD-10-CM: F90.2  ICD-9-CM: 314.01         Functionality: pt having minimal impairment in important areas of daily functioning.  Prognosis: Good dependent on medication/follow up and treatment plan compliance.  UDS obtained today and pending.  stephanie reviewed.    PHQ-2 Depression Screening  Little interest or pleasure in doing things? 0   Feeling down, depressed, or " hopeless? 0   PHQ-2 Total Score 0           She is to continue the adderall for the adhd.  No medication adjustment needed at this time.  Continuing efforts to promote the therapeutic alliance, address the patient's issues, and strengthen self awareness, insights, and coping skills  .  Patient is agreeable to call the  Clinic with worsening symptoms.  .  Patient is aware to call 911 or go to the nearest ER should begin having SI/HI.   Turn to clinic in 12-16  weeks.             This document has been electronically signed by LAURIE Swain on   January 12, 2022 @NOW@.

## 2022-04-13 ENCOUNTER — TELEMEDICINE (OUTPATIENT)
Dept: PSYCHIATRY | Facility: CLINIC | Age: 57
End: 2022-04-13

## 2022-04-13 DIAGNOSIS — F90.2 ADHD (ATTENTION DEFICIT HYPERACTIVITY DISORDER), COMBINED TYPE: Primary | ICD-10-CM

## 2022-04-13 PROCEDURE — 99213 OFFICE O/P EST LOW 20 MIN: CPT | Performed by: NURSE PRACTITIONER

## 2022-04-13 RX ORDER — DEXTROAMPHETAMINE SACCHARATE, AMPHETAMINE ASPARTATE MONOHYDRATE, DEXTROAMPHETAMINE SULFATE AND AMPHETAMINE SULFATE 2.5; 2.5; 2.5; 2.5 MG/1; MG/1; MG/1; MG/1
10 CAPSULE, EXTENDED RELEASE ORAL EVERY MORNING
Qty: 30 CAPSULE | Refills: 0 | Status: SHIPPED | OUTPATIENT
Start: 2022-04-13 | End: 2022-07-14 | Stop reason: SDUPTHER

## 2022-04-13 RX ORDER — THYROID 30 MG/1
30 TABLET ORAL DAILY
COMMUNITY
Start: 2022-04-06

## 2022-04-13 RX ORDER — POLYMYXIN B SULFATE AND TRIMETHOPRIM 1; 10000 MG/ML; [USP'U]/ML
SOLUTION OPHTHALMIC
COMMUNITY
Start: 2022-03-28 | End: 2023-02-15

## 2022-04-13 NOTE — PROGRESS NOTES
Subjective   Patient ID: Elham Calderón is a 56 y.o. female Pt is being seen today via telemed through My Chart.  Provider is located at the office. She gives permission for the visit to occur via telehealth.  pt is located at her office.       CC;  Recheck on ADHD  History of Present Illness   Pt says she continues to do well. 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   Taking the stimulant aprox 3-4 times a week when she works.  No negative side effects.  Sleeping well.  Mood is stable.  Denies any depression or anxiety.  Continues to be pleased.  No medical stressors.      The following portions of the patient's history were reviewed and updated as appropriate: allergies, current medications, past family history, past medical history, past social history, past surgical history and problem list.    Review of Systems   Constitutional: Negative for activity change, appetite change and fatigue.   HENT: Negative.    Eyes: Negative for visual disturbance.   Respiratory: Negative.    Cardiovascular: Negative.    Gastrointestinal: Negative for nausea.   Endocrine: Negative.    Genitourinary: Negative.    Musculoskeletal: Negative for arthralgias.   Skin: Negative.    Allergic/Immunologic: Negative.    Neurological: Negative for dizziness, seizures and headaches.   Hematological: Negative.    Psychiatric/Behavioral: Negative for agitation, behavioral problems, confusion, decreased concentration, dysphoric mood, hallucinations, self-injury, sleep disturbance and suicidal ideas. The patient is not nervous/anxious and is not hyperactive.      Reviewed copied data and there are no changes    Objective   Mental Status Exam  Appearance:  clean and casually dressed, appropriate  Attitude toward clinician:  cooperative and agreeable   Speech:    Rate:  regular rate and rhythm   Volume:  normal  Motor:  no abnormal movements present  Mood:  wnl  Affect:  euthymic  Thought Processes:  linear, logical, and goal  directed  Thought Content:  normal  Suicidal Thoughts:  absent  Homicidal Thoughts:  absent  Perceptual Disturbance: no perceptual disturbance  Attention and Concentration:  good  Insight and Judgement:  good  Memory:  memory appears to be intact  Physical Exam  Constitutional:       Appearance: Normal appearance.   Musculoskeletal:      Cervical back: Normal range of motion.   Neurological:      General: No focal deficit present.      Mental Status: She is alert and oriented to person, place, and time.   Psychiatric:         Attention and Perception: Attention normal.         Mood and Affect: Mood normal.         Speech: Speech normal.         Behavior: Behavior normal.         Thought Content: Thought content normal.         Cognition and Memory: Cognition normal.         Judgment: Judgment normal.      Comments: Pleasant and cooperative       Lab Review:   not applicable  Assessment/Plan   Diagnoses and all orders for this visit:    1. ADHD (attention deficit hyperactivity disorder), combined type (Primary)  -     amphetamine-dextroamphetamine XR (ADDERALL XR) 10 MG 24 hr capsule; Take 1 capsule by mouth Every Morning  Dispense: 30 capsule; Refill: 0    She is to continue the Adderall.  Roderick reviewed.  Previous UDS in the past reviewed and appropriate.  Refill has been submitted.  She will follow-up in 3 months for a telehealth visit and then in 6 months for an in person visit.  She will let me know should any problems develop I will see her sooner.  No follow-ups on file.

## 2022-05-12 ENCOUNTER — HOSPITAL ENCOUNTER (EMERGENCY)
Facility: HOSPITAL | Age: 57
Discharge: HOME | End: 2022-05-12
Attending: STUDENT IN AN ORGANIZED HEALTH CARE EDUCATION/TRAINING PROGRAM | Admitting: STUDENT IN AN ORGANIZED HEALTH CARE EDUCATION/TRAINING PROGRAM

## 2022-05-12 ENCOUNTER — APPOINTMENT (OUTPATIENT)
Dept: CT IMAGING | Facility: HOSPITAL | Age: 57
End: 2022-05-12

## 2022-05-12 VITALS
OXYGEN SATURATION: 100 % | DIASTOLIC BLOOD PRESSURE: 74 MMHG | HEIGHT: 65 IN | TEMPERATURE: 98.1 F | RESPIRATION RATE: 14 BRPM | WEIGHT: 155 LBS | BODY MASS INDEX: 25.83 KG/M2 | HEART RATE: 70 BPM | SYSTOLIC BLOOD PRESSURE: 118 MMHG

## 2022-05-12 DIAGNOSIS — K59.00 CONSTIPATION, UNSPECIFIED CONSTIPATION TYPE: Primary | ICD-10-CM

## 2022-05-12 DIAGNOSIS — R10.84 GENERALIZED ABDOMINAL PAIN: ICD-10-CM

## 2022-05-12 LAB
ALBUMIN SERPL-MCNC: 4.3 G/DL (ref 3.5–5.2)
ALBUMIN/GLOB SERPL: 1.8 G/DL
ALP SERPL-CCNC: 47 U/L (ref 39–117)
ALT SERPL W P-5'-P-CCNC: 19 U/L (ref 1–33)
ANION GAP SERPL CALCULATED.3IONS-SCNC: 10.1 MMOL/L (ref 5–15)
AST SERPL-CCNC: 20 U/L (ref 1–32)
BASOPHILS # BLD AUTO: 0.03 10*3/MM3 (ref 0–0.2)
BASOPHILS NFR BLD AUTO: 1.2 % (ref 0–1.5)
BILIRUB SERPL-MCNC: 0.4 MG/DL (ref 0–1.2)
BILIRUB UR QL STRIP: NEGATIVE
BUN SERPL-MCNC: 16 MG/DL (ref 6–20)
BUN/CREAT SERPL: 15.4 (ref 7–25)
CALCIUM SPEC-SCNC: 9 MG/DL (ref 8.6–10.5)
CHLORIDE SERPL-SCNC: 106 MMOL/L (ref 98–107)
CLARITY UR: CLEAR
CO2 SERPL-SCNC: 26.9 MMOL/L (ref 22–29)
COLOR UR: YELLOW
CREAT SERPL-MCNC: 1.04 MG/DL (ref 0.57–1)
D-LACTATE SERPL-SCNC: 0.9 MMOL/L (ref 0.5–2)
DEPRECATED RDW RBC AUTO: 39.7 FL (ref 37–54)
EGFRCR SERPLBLD CKD-EPI 2021: 63.6 ML/MIN/1.73
EOSINOPHIL # BLD AUTO: 0.05 10*3/MM3 (ref 0–0.4)
EOSINOPHIL NFR BLD AUTO: 2 % (ref 0.3–6.2)
ERYTHROCYTE [DISTWIDTH] IN BLOOD BY AUTOMATED COUNT: 11.9 % (ref 12.3–15.4)
GLOBULIN UR ELPH-MCNC: 2.4 GM/DL
GLUCOSE SERPL-MCNC: 103 MG/DL (ref 65–99)
GLUCOSE UR STRIP-MCNC: NEGATIVE MG/DL
HCT VFR BLD AUTO: 43.2 % (ref 34–46.6)
HGB BLD-MCNC: 14.3 G/DL (ref 12–15.9)
HGB UR QL STRIP.AUTO: NEGATIVE
HOLD SPECIMEN: NORMAL
HOLD SPECIMEN: NORMAL
IMM GRANULOCYTES # BLD AUTO: 0.02 10*3/MM3 (ref 0–0.05)
IMM GRANULOCYTES NFR BLD AUTO: 0.8 % (ref 0–0.5)
KETONES UR QL STRIP: NEGATIVE
LEUKOCYTE ESTERASE UR QL STRIP.AUTO: NEGATIVE
LIPASE SERPL-CCNC: 66 U/L (ref 13–60)
LYMPHOCYTES # BLD AUTO: 0.61 10*3/MM3 (ref 0.7–3.1)
LYMPHOCYTES NFR BLD AUTO: 24.2 % (ref 19.6–45.3)
MCH RBC QN AUTO: 30.4 PG (ref 26.6–33)
MCHC RBC AUTO-ENTMCNC: 33.1 G/DL (ref 31.5–35.7)
MCV RBC AUTO: 91.9 FL (ref 79–97)
MONOCYTES # BLD AUTO: 0.24 10*3/MM3 (ref 0.1–0.9)
MONOCYTES NFR BLD AUTO: 9.5 % (ref 5–12)
NEUTROPHILS NFR BLD AUTO: 1.57 10*3/MM3 (ref 1.7–7)
NEUTROPHILS NFR BLD AUTO: 62.3 % (ref 42.7–76)
NITRITE UR QL STRIP: NEGATIVE
NRBC BLD AUTO-RTO: 0 /100 WBC (ref 0–0.2)
PH UR STRIP.AUTO: 6 [PH] (ref 5–8)
PLATELET # BLD AUTO: 178 10*3/MM3 (ref 140–450)
PMV BLD AUTO: 10.3 FL (ref 6–12)
POTASSIUM SERPL-SCNC: 4.5 MMOL/L (ref 3.5–5.2)
PROT SERPL-MCNC: 6.7 G/DL (ref 6–8.5)
PROT UR QL STRIP: NEGATIVE
RBC # BLD AUTO: 4.7 10*6/MM3 (ref 3.77–5.28)
SODIUM SERPL-SCNC: 143 MMOL/L (ref 136–145)
SP GR UR STRIP: 1.02 (ref 1–1.03)
UROBILINOGEN UR QL STRIP: NORMAL
WBC NRBC COR # BLD: 2.52 10*3/MM3 (ref 3.4–10.8)
WHOLE BLOOD HOLD COAG: NORMAL
WHOLE BLOOD HOLD SPECIMEN: NORMAL

## 2022-05-12 PROCEDURE — 85025 COMPLETE CBC W/AUTO DIFF WBC: CPT | Performed by: STUDENT IN AN ORGANIZED HEALTH CARE EDUCATION/TRAINING PROGRAM

## 2022-05-12 PROCEDURE — 74177 CT ABD & PELVIS W/CONTRAST: CPT

## 2022-05-12 PROCEDURE — 81003 URINALYSIS AUTO W/O SCOPE: CPT | Performed by: STUDENT IN AN ORGANIZED HEALTH CARE EDUCATION/TRAINING PROGRAM

## 2022-05-12 PROCEDURE — 80053 COMPREHEN METABOLIC PANEL: CPT | Performed by: STUDENT IN AN ORGANIZED HEALTH CARE EDUCATION/TRAINING PROGRAM

## 2022-05-12 PROCEDURE — 83690 ASSAY OF LIPASE: CPT | Performed by: STUDENT IN AN ORGANIZED HEALTH CARE EDUCATION/TRAINING PROGRAM

## 2022-05-12 PROCEDURE — 99283 EMERGENCY DEPT VISIT LOW MDM: CPT

## 2022-05-12 PROCEDURE — 25010000002 IOPAMIDOL 61 % SOLUTION: Performed by: STUDENT IN AN ORGANIZED HEALTH CARE EDUCATION/TRAINING PROGRAM

## 2022-05-12 PROCEDURE — 74177 CT ABD & PELVIS W/CONTRAST: CPT | Performed by: RADIOLOGY

## 2022-05-12 PROCEDURE — 83605 ASSAY OF LACTIC ACID: CPT | Performed by: STUDENT IN AN ORGANIZED HEALTH CARE EDUCATION/TRAINING PROGRAM

## 2022-05-12 PROCEDURE — 36415 COLL VENOUS BLD VENIPUNCTURE: CPT

## 2022-05-12 RX ORDER — SODIUM CHLORIDE 0.9 % (FLUSH) 0.9 %
10 SYRINGE (ML) INJECTION AS NEEDED
Status: DISCONTINUED | OUTPATIENT
Start: 2022-05-12 | End: 2022-05-12 | Stop reason: HOSPADM

## 2022-05-12 RX ADMIN — IOPAMIDOL 88 ML: 612 INJECTION, SOLUTION INTRAVENOUS at 09:13

## 2022-05-12 NOTE — ED PROVIDER NOTES
Subjective   56-year-old female with a past medical history of lupus presents to the ER with primary complaint of generalized abdominal pain, vomiting, and no bowel movement for the past week.  Patient noted a previous history of a small bowel obstruction 10 years prior to this presentation.  Denied hematemesis.  Denied significant fever or chills.  Denied changes in urinary habits.  Patient also noted a history of constipation.  No obvious aggravating or alleviating factors.  Vitals stable          Review of Systems   Gastrointestinal: Positive for abdominal pain and constipation.   All other systems reviewed and are negative.      Past Medical History:   Diagnosis Date   • Familial neutropenia (HCC)    • Gout    • Lupus (HCC)    • Lupus (HCC)        No Known Allergies    Past Surgical History:   Procedure Laterality Date   • CARDIAC CATHETERIZATION N/A 6/5/2017    Procedure: Left Heart Cath;  Surgeon: Randy Atkinson MD;  Location: [facility] CATH INVASIVE LOCATION;  Service:    • HYSTERECTOMY         Family History   Problem Relation Age of Onset   • Osteoarthritis Mother    • Hypertension Mother    • Heart disease Father    • Gout Father    • Osteoarthritis Maternal Grandmother    • Gout Maternal Grandmother        Social History     Socioeconomic History   • Marital status: Single   Tobacco Use   • Smoking status: Never Smoker   • Smokeless tobacco: Never Used   Vaping Use   • Vaping Use: Never used   Substance and Sexual Activity   • Alcohol use: No   • Drug use: No   • Sexual activity: Yes     Partners: Male     Birth control/protection: Surgical           Objective   Physical Exam  Constitutional:       General: She is not in acute distress.     Appearance: Normal appearance. She is not ill-appearing.   HENT:      Head: Normocephalic and atraumatic.      Right Ear: External ear normal.      Left Ear: External ear normal.      Nose: Nose normal.      Mouth/Throat:      Mouth: Mucous membranes are moist.   Eyes:       Extraocular Movements: Extraocular movements intact.      Pupils: Pupils are equal, round, and reactive to light.   Cardiovascular:      Rate and Rhythm: Normal rate and regular rhythm.      Heart sounds: No murmur heard.  Pulmonary:      Effort: Pulmonary effort is normal. No respiratory distress.      Breath sounds: Normal breath sounds. No wheezing.   Abdominal:      General: Bowel sounds are normal.      Palpations: Abdomen is soft.      Tenderness: There is generalized abdominal tenderness.   Musculoskeletal:         General: No deformity or signs of injury. Normal range of motion.      Cervical back: Normal range of motion and neck supple.   Skin:     General: Skin is warm and dry.      Findings: No erythema.   Neurological:      General: No focal deficit present.      Mental Status: She is alert and oriented to person, place, and time. Mental status is at baseline.      Cranial Nerves: No cranial nerve deficit.   Psychiatric:         Mood and Affect: Mood normal.         Behavior: Behavior normal.         Thought Content: Thought content normal.         Procedures           ED Course  ED Course as of 05/12/22 0944   Thu May 12, 2022   0943 CBC and CCP unremarkable.  Lipase within normal limits.  Lactic acid within normal limits.  Urinalysis unremarkable.  CT abdomen pelvis with IV contrast noted concerns for large stool burden and constipation.  Patient was given fleets enema to use at home.  Recommended over-the-counter constipation therapy.  Work up and results were discussed throughly with the patient.  The patient will be discharged for further monitoring and management with their PCP.  Red flags, warning signs, worsening symptoms, and when to return to the ER discussed with and understood by the patient.  Patient will follow up with their PCP in a timely manner.  Vitals stable at discharge. [SF]      ED Course User Index  [SF] Channing Lugo DO                                                  MDM    Final diagnoses:   Constipation, unspecified constipation type   Generalized abdominal pain       ED Disposition  ED Disposition     ED Disposition   Discharge    Condition   Stable    Comment   --             Kevin Mercedes MD  [address]  [facility] [address]  [phone]    In 1 week      [facility] Emergency Department  [address] [place] [id number] [phone]    If symptoms worsen         Medication List      No changes were made to your prescriptions during this visit.          Channing Lugo DO  05/12/22 0944

## 2022-05-12 NOTE — ED NOTES
MEDICAL SCREENING:    Reason for Visit: Constipation/Abdominal Pain/N/V    Patient initially seen in triage.  The patient was advised further evaluation and diagnostic testing will be needed, some of the treatment and testing will be initiated in the lobby in order to begin the process.  The patient will be returned to the waiting area for the time being and possibly be re-assessed by a subsequent ED provider.  The patient will be brought back to the treatment area in as timely manner as possible.         Channing Lugo,   05/12/22 0816

## 2022-07-14 ENCOUNTER — TELEMEDICINE (OUTPATIENT)
Dept: PSYCHIATRY | Facility: CLINIC | Age: 57
End: 2022-07-14

## 2022-07-14 DIAGNOSIS — F90.2 ADHD (ATTENTION DEFICIT HYPERACTIVITY DISORDER), COMBINED TYPE: Primary | ICD-10-CM

## 2022-07-14 PROCEDURE — 99213 OFFICE O/P EST LOW 20 MIN: CPT | Performed by: NURSE PRACTITIONER

## 2022-07-14 RX ORDER — DEXTROAMPHETAMINE SACCHARATE, AMPHETAMINE ASPARTATE MONOHYDRATE, DEXTROAMPHETAMINE SULFATE AND AMPHETAMINE SULFATE 2.5; 2.5; 2.5; 2.5 MG/1; MG/1; MG/1; MG/1
10 CAPSULE, EXTENDED RELEASE ORAL EVERY MORNING
Qty: 30 CAPSULE | Refills: 0 | Status: SHIPPED | OUTPATIENT
Start: 2022-07-14 | End: 2023-02-15

## 2022-07-14 NOTE — PROGRESS NOTES
Subjective   Patient ID: Elham Calderón is a 56 y.o. female Pt is being seen today via telemed through My Chart.  Provider is located at the office [address] [facility].  Pt is located at her place of work.  . She gives permission for the visit to occur via telehealth.  pt is located at her office.       CC;  Recheck on ADHD  History of Present Illness   Pt says she continues to do well. 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   Taking the stimulant aprox 2-3 times a week when she works. She is able to keep her work caught up with the medication.   No negative side effects.  Sleeping well.  Mood is stable.  Denies any depression or anxiety.  Continues to be pleased.  No medical stressors.      The following portions of the patient's history were reviewed and updated as appropriate: allergies, current medications, past family history, past medical history, past social history, past surgical history and problem list.    Review of Systems   Constitutional: Negative for activity change, appetite change and fatigue.   HENT: Negative.    Eyes: Negative for visual disturbance.   Respiratory: Negative.    Cardiovascular: Negative.    Gastrointestinal: Negative for nausea.   Endocrine: Negative.    Genitourinary: Negative.    Musculoskeletal: Negative for arthralgias.   Skin: Negative.    Allergic/Immunologic: Negative.    Neurological: Negative for dizziness, seizures and headaches.   Hematological: Negative.    Psychiatric/Behavioral: Negative for agitation, behavioral problems, confusion, decreased concentration, dysphoric mood, hallucinations, self-injury, sleep disturbance and suicidal ideas. The patient is not nervous/anxious and is not hyperactive.      Reviewed copied data and there are no changes    Objective   Mental Status Exam  Appearance:  clean and casually dressed, appropriate  Attitude toward clinician:  cooperative and agreeable   Speech:    Rate:  regular rate and rhythm   Volume:   normal  Motor:  no abnormal movements present  Mood:  wnl  Affect:  euthymic  Thought Processes:  linear, logical, and goal directed  Thought Content:  normal  Suicidal Thoughts:  absent  Homicidal Thoughts:  absent  Perceptual Disturbance: no perceptual disturbance  Attention and Concentration:  good  Insight and Judgement:  good  Memory:  memory appears to be intact  Physical Exam  Constitutional:       Appearance: Normal appearance.   Musculoskeletal:      Cervical back: Normal range of motion.   Neurological:      General: No focal deficit present.      Mental Status: She is alert and oriented to person, place, and time.   Psychiatric:         Attention and Perception: Attention normal.         Mood and Affect: Mood normal.         Speech: Speech normal.         Behavior: Behavior normal.         Thought Content: Thought content normal.         Cognition and Memory: Cognition normal.         Judgment: Judgment normal.      Comments: Pleasant and cooperative       Lab Review:   not applicable  Assessment & Plan   Diagnoses and all orders for this visit:    1. ADHD (attention deficit hyperactivity disorder), combined type (Primary)  -     amphetamine-dextroamphetamine XR (ADDERALL XR) 10 MG 24 hr capsule; Take 1 capsule by mouth Every Morning  Dispense: 30 capsule; Refill: 0    patient continues to be pleased with current medication.  She is to continue the Adderall for the ADHD.  .  Roderick reviewed.  Previous UDS in the past reviewed and appropriate.  Refill has been submitted.  She will follow-up in 3 months for a telehealth visit and then in 6 months for an in person visit.  She will let me know should any problems develop I will see her sooner.  No follow-ups on file.

## 2022-11-01 ENCOUNTER — TELEMEDICINE (OUTPATIENT)
Dept: PSYCHIATRY | Facility: CLINIC | Age: 57
End: 2022-11-01

## 2022-11-01 DIAGNOSIS — F90.2 ADHD (ATTENTION DEFICIT HYPERACTIVITY DISORDER), COMBINED TYPE: Primary | ICD-10-CM

## 2022-11-01 PROCEDURE — 99214 OFFICE O/P EST MOD 30 MIN: CPT | Performed by: NURSE PRACTITIONER

## 2022-11-01 RX ORDER — ESTRADIOL 0.05 MG/D
FILM, EXTENDED RELEASE TRANSDERMAL
COMMUNITY
Start: 2022-10-28

## 2022-11-01 RX ORDER — LISDEXAMFETAMINE DIMESYLATE 10 MG/1
10 CAPSULE ORAL DAILY
Qty: 30 CAPSULE | Refills: 0 | Status: SHIPPED | OUTPATIENT
Start: 2022-11-01 | End: 2022-12-22 | Stop reason: SDUPTHER

## 2022-11-01 NOTE — PROGRESS NOTES
Subjective   Patient ID: Elham Calderón is a 57 y.o. female Pt is being seen today via telemed through My Chart.  Provider is located at the office [address] [facility].  Pt is located at her place of work.  . She gives permission for the visit to occur via telehealth.  pt is located at her office.       CC;  Recheck on ADHD  History of Present Illness   Pt says she continues to do well. She has now gotten different insurance and would like to try different stimulant with longer action.  She continues to only take it aprox 2-3 times a week mainly while she is working.  No negative side effects.  Sleeping well.  Denies any depression or excessive anxiety.  No medical stressors.  Mood is stable.  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  Stimulant continues to help her stay on task and complete things and not get behind in charting.           The following portions of the patient's history were reviewed and updated as appropriate: allergies, current medications, past family history, past medical history, past social history, past surgical history and problem list.    Review of Systems   Constitutional: Negative for activity change, appetite change and fatigue.   HENT: Negative.    Eyes: Negative for visual disturbance.   Respiratory: Negative.    Cardiovascular: Negative.    Gastrointestinal: Negative for nausea.   Endocrine: Negative.    Genitourinary: Negative.    Musculoskeletal: Negative for arthralgias.   Skin: Negative.    Allergic/Immunologic: Negative.    Neurological: Negative for dizziness, seizures and headaches.   Hematological: Negative.    Psychiatric/Behavioral: Negative for agitation, behavioral problems, confusion, decreased concentration, dysphoric mood, hallucinations, self-injury, sleep disturbance and suicidal ideas. The patient is not nervous/anxious and is not hyperactive.      Reviewed copied data and there are no changes    Objective   Mental Status  Exam  Appearance:  clean and casually dressed, appropriate  Attitude toward clinician:  cooperative and agreeable   Speech:    Rate:  regular rate and rhythm   Volume:  normal  Motor:  no abnormal movements present  Mood:  wnl  Affect:  euthymic  Thought Processes:  linear, logical, and goal directed  Thought Content:  normal  Suicidal Thoughts:  absent  Homicidal Thoughts:  absent  Perceptual Disturbance: no perceptual disturbance  Attention and Concentration:  good  Insight and Judgement:  good  Memory:  memory appears to be intact  Physical Exam  Constitutional:       Appearance: Normal appearance.   Musculoskeletal:      Cervical back: Normal range of motion.   Neurological:      General: No focal deficit present.      Mental Status: She is alert and oriented to person, place, and time.   Psychiatric:         Attention and Perception: Attention normal.         Mood and Affect: Mood normal.         Speech: Speech normal.         Behavior: Behavior normal.         Thought Content: Thought content normal.         Cognition and Memory: Cognition normal.         Judgment: Judgment normal.      Comments: Pleasant and cooperative       Lab Review:   not applicable  Assessment & Plan   Diagnoses and all orders for this visit:    1. ADHD (attention deficit hyperactivity disorder), combined type (Primary)  -     lisdexamfetamine dimesylate (Vyvanse) 10 MG capsule; Take 1 capsule by mouth Daily  Dispense: 30 capsule; Refill: 0    I am switching her to vyvanse.  She will start with low dosage.  Hopefully the savings card will work.  If she does not notice an effect at 10mg she will try taking 2 of them.  she will let me know how this is going.  Will RTC in person in January.  .  Roderick reviewed.  Previous UDS in the past reviewed and appropriate.  Refill has been submitted.  .  She will let me know should any problems develop I will see her sooner.  No follow-ups on file.

## 2022-11-07 ENCOUNTER — TELEPHONE (OUTPATIENT)
Dept: FAMILY MEDICINE CLINIC | Facility: CLINIC | Age: 57
End: 2022-11-07

## 2022-12-22 DIAGNOSIS — F90.2 ADHD (ATTENTION DEFICIT HYPERACTIVITY DISORDER), COMBINED TYPE: ICD-10-CM

## 2022-12-22 RX ORDER — LISDEXAMFETAMINE DIMESYLATE 10 MG/1
10 CAPSULE ORAL DAILY
Qty: 30 CAPSULE | Refills: 0 | Status: SHIPPED | OUTPATIENT
Start: 2022-12-22 | End: 2023-02-15 | Stop reason: SDUPTHER

## 2023-02-15 ENCOUNTER — OFFICE VISIT (OUTPATIENT)
Dept: PSYCHIATRY | Facility: CLINIC | Age: 58
End: 2023-02-15
Payer: COMMERCIAL

## 2023-02-15 VITALS
TEMPERATURE: 98 F | SYSTOLIC BLOOD PRESSURE: 128 MMHG | HEIGHT: 65 IN | HEART RATE: 75 BPM | BODY MASS INDEX: 25.46 KG/M2 | WEIGHT: 152.8 LBS | OXYGEN SATURATION: 99 % | DIASTOLIC BLOOD PRESSURE: 85 MMHG

## 2023-02-15 DIAGNOSIS — F90.2 ADHD (ATTENTION DEFICIT HYPERACTIVITY DISORDER), COMBINED TYPE: Primary | ICD-10-CM

## 2023-02-15 DIAGNOSIS — J06.9 VIRAL UPPER RESPIRATORY TRACT INFECTION: ICD-10-CM

## 2023-02-15 PROCEDURE — 99214 OFFICE O/P EST MOD 30 MIN: CPT | Performed by: NURSE PRACTITIONER

## 2023-02-15 RX ORDER — ESTRADIOL 0.1 MG/D
FILM, EXTENDED RELEASE TRANSDERMAL
COMMUNITY
Start: 2022-12-13

## 2023-02-15 RX ORDER — ONDANSETRON 8 MG/1
TABLET, ORALLY DISINTEGRATING ORAL
COMMUNITY
Start: 2023-01-23

## 2023-02-15 RX ORDER — HYDROXYCHLOROQUINE SULFATE 200 MG/1
TABLET, FILM COATED ORAL
COMMUNITY
Start: 2022-12-13

## 2023-02-15 RX ORDER — LEVOTHYROXINE SODIUM 25 MCG
TABLET ORAL
COMMUNITY
Start: 2023-01-23

## 2023-02-15 RX ORDER — LISDEXAMFETAMINE DIMESYLATE 10 MG/1
10 CAPSULE ORAL DAILY
Qty: 30 CAPSULE | Refills: 0 | Status: SHIPPED | OUTPATIENT
Start: 2023-02-15

## 2023-02-15 RX ORDER — AZITHROMYCIN 250 MG/1
TABLET, FILM COATED ORAL
Qty: 6 TABLET | Refills: 0 | Status: SHIPPED | OUTPATIENT
Start: 2023-02-15 | End: 2023-02-20

## 2023-02-15 NOTE — PROGRESS NOTES
Subjective   Elham Calderón is a 57 y.o. female is here today for medication management follow-up.    Chief Complaint:  Recheck on ADHD    History of Present Illness:  Pt presents for follow up. . She is doing really well.  He has recently been on a trip to [place] and had a really good time.  He denies any depression or excessive anxiety.  The Vyvanse continues to help with her focus and concentration and completing tasks.  Usually only taking them on workdays.  She is sleeping well without any difficulty.  She does have some upper respiratory symptoms since returning from [place].  She has been checked for COVID flow all of it negative.  Has stuffy nose.  No tent.  Productive cough at times.  Mood is stable.Body mass index is 25.43 kg/m². no appetite changes.  No negative side effects to the med    The following portions of the patient's history were reviewed and updated as appropriate: allergies, current medications, past family history, past medical history, past social history, past surgical history and problem list.    Review of Systems   Constitutional: Negative for activity change, appetite change and fatigue.   HENT: Positive for congestion.    Eyes: Negative for visual disturbance.   Respiratory: Positive for cough.    Cardiovascular: Negative.    Gastrointestinal: Negative for nausea.   Endocrine: Negative.    Genitourinary: Negative.    Musculoskeletal: Negative for arthralgias.   Skin: Negative.    Allergic/Immunologic: Negative.    Neurological: Negative for dizziness, seizures and headaches.   Hematological: Negative.    Psychiatric/Behavioral: Negative for agitation, behavioral problems, confusion, decreased concentration, dysphoric mood, hallucinations, self-injury, sleep disturbance and suicidal ideas. The patient is not nervous/anxious and is not hyperactive.      Reviewed copied data and there are no changes    Objective   Physical Exam   Constitutional: She is oriented to person, place, and  "time. She appears well-developed.   HENT:   Head: Normocephalic.   Neurological: She is alert and oriented to person, place, and time.   Psychiatric: Her speech is normal and behavior is normal. Judgment and thought content normal.   Pleasant and cooperative   Vitals reviewed.    Blood pressure 128/85, pulse 75, temperature 98 °F (36.7 °C), height 165.1 cm (65\"), weight 69.3 kg (152 lb 12.8 oz), SpO2 99 %.    Medication List:   Current Outpatient Medications   Medication Sig Dispense Refill   • lisdexamfetamine dimesylate (Vyvanse) 10 MG capsule Take 1 capsule by mouth Daily 30 capsule 0   • [place] Thyroid 30 MG tablet Take 30 mg by mouth Daily.     • azithromycin (Zithromax Z-Kyle) 250 MG tablet Take 2 tablets (500 mg) on  Day 1,  followed by 1 tablet (250 mg) once daily on Days 2 through 5. 6 tablet 0   • DHEA 25 MG capsule Take  by mouth.     • estradiol (MINIVELLE, VIVELLE-DOT) 0.05 MG/24HR patch apply 1 patch TWICE A WEEK     • estradiol (VIVELLE-DOT) 0.1 MG/24HR patch      • Hormone Cream Base (HRT BASE) cream      • hydroxychloroquine (PLAQUENIL) 200 MG tablet      • ondansetron ODT (ZOFRAN-ODT) 8 MG disintegrating tablet      • Synthroid 25 MCG tablet      • trimethoprim-polymyxin b (POLYTRIM) 50372-4.1 UNIT/ML-% ophthalmic solution Instill 1-2 drops in BOTH eyes TWICE DAILY for pink eye     • vitamin D (ERGOCALCIFEROL) 1.25 MG (73292 UT) capsule capsule Take 50,000 Units by mouth 1 (One) Time Per Week.       No current facility-administered medications for this visit.     Reviewed copied data and there are no changes    Mental Status Exam:   Hygiene:   good  Cooperation:  Cooperative  Eye Contact:  Good  Psychomotor Behavior:  Appropriate  Affect:  Full range  Hopelessness: Denies  Speech:  Normal  Thought Process:  Goal directed  Thought Content:  Normal  Suicidal:  None  Homicidal:  None  Hallucinations:  None  Delusion:  None  Memory:  Intact  Orientation:  Person, Place, Time and Situation  Reliability:  " good  Insight:  Good  Judgement:  Good  Impulse Control:  Good  Physical/Medical Issues:  No     Assessment & Plan   Problems Addressed this Visit    None  Visit Diagnoses     ADHD (attention deficit hyperactivity disorder), combined type    -  Primary    Relevant Medications    lisdexamfetamine dimesylate (Vyvanse) 10 MG capsule    Viral upper respiratory tract infection        Relevant Medications    azithromycin (Zithromax Z-Kyle) 250 MG tablet      Diagnoses       Codes Comments    ADHD (attention deficit hyperactivity disorder), combined type    -  Primary ICD-10-CM: F90.2  ICD-9-CM: 314.01     Viral upper respiratory tract infection     ICD-10-CM: J06.9  ICD-9-CM: 465.9         Functionality: pt having minimal impairment in important areas of daily functioning.  Prognosis: Good dependent on medication/follow up and treatment plan compliance.  UDS obtained today and pending.  stephanie reviewed.    PHQ-2 Depression Screening  Little interest or pleasure in doing things?     Feeling down, depressed, or hopeless?     PHQ-2 Total Score       She is to continue the adderall for the adhd.  No medication adjustment needed at this time.  I am going to go ahead and send her in a Z-Kyle for her upper respiratory infection.  Continuing efforts to promote the therapeutic alliance, address the patient's issues, and strengthen self awareness, insights, and coping skills  .  Patient is agreeable to call the  Clinic with worsening symptoms.  .  Patient is aware to call 911 or go to the nearest ER should begin having SI/HI.   Turn to clinic in 16  weeks.             This document has been electronically signed by LAURIE Swain on   February 15, 2023 @NOW@.

## 2023-04-26 DIAGNOSIS — F90.2 ADHD (ATTENTION DEFICIT HYPERACTIVITY DISORDER), COMBINED TYPE: ICD-10-CM

## 2023-04-26 RX ORDER — LISDEXAMFETAMINE DIMESYLATE 10 MG/1
10 CAPSULE ORAL DAILY
Qty: 30 CAPSULE | Refills: 0 | Status: SHIPPED | OUTPATIENT
Start: 2023-04-26

## 2023-06-29 PROBLEM — A69.22: Status: ACTIVE | Noted: 2023-06-29

## 2023-06-29 PROBLEM — A69.20 LYME DISEASE: Status: ACTIVE | Noted: 2023-06-29

## 2023-08-22 DIAGNOSIS — F90.2 ADHD (ATTENTION DEFICIT HYPERACTIVITY DISORDER), COMBINED TYPE: ICD-10-CM

## 2023-08-23 RX ORDER — LISDEXAMFETAMINE DIMESYLATE 10 MG/1
CAPSULE ORAL
Qty: 30 CAPSULE | Refills: 0 | Status: SHIPPED | OUTPATIENT
Start: 2023-08-23

## 2023-09-22 DIAGNOSIS — F90.2 ADHD (ATTENTION DEFICIT HYPERACTIVITY DISORDER), COMBINED TYPE: ICD-10-CM

## 2023-09-25 RX ORDER — LISDEXAMFETAMINE DIMESYLATE CAPSULES 10 MG/1
10 CAPSULE ORAL DAILY
Qty: 30 CAPSULE | Refills: 0 | Status: SHIPPED | OUTPATIENT
Start: 2023-09-25

## 2023-10-10 ENCOUNTER — TELEMEDICINE (OUTPATIENT)
Dept: PSYCHIATRY | Facility: CLINIC | Age: 58
End: 2023-10-10
Payer: COMMERCIAL

## 2023-10-10 DIAGNOSIS — F90.2 ADHD (ATTENTION DEFICIT HYPERACTIVITY DISORDER), COMBINED TYPE: Primary | ICD-10-CM

## 2023-10-10 PROCEDURE — 99213 OFFICE O/P EST LOW 20 MIN: CPT | Performed by: NURSE PRACTITIONER

## 2023-10-10 NOTE — PROGRESS NOTES
"      Susana Calderón is a 58 y.o. female is here today for medication management follow-up."This provider is located at [facility], [address] [place] [address]. The provider identified herself as well as her credentials.   The Patient is located at work. The patient's condition being diagnosed/treated is appropriate for telemedicine. The patient gave consent to be seen remotely, and when consent is given they understand that the consent allows for patient identifiable information to be sent to a third party as needed.   They may refuse to be seen remotely at any time. The electronic data is encrypted and password protected, and the patient has been advised of the potential risks to privacy not withstanding such measures.     Chief Complaint:  Recheck on ADHD    History of Present Illness: Patient states that she is doing well.  The Vyvanse continues to help with focus and concentration.  She states she is up to taking the medication approximately 4 days a week now and is doing better at work.  She was trying to take it as least as possible but realized this was not working for her.  She denies any depression or excessive anxiety.  Mood is stable.  Sleep is adequate.  No medical stressors.  No negative side effects to the meds.  The following portions of the patient's history were reviewed and updated as appropriate: allergies, current medications, past family history, past medical history, past social history, past surgical history and problem list.    Review of Systems   Constitutional:  Negative for activity change, appetite change and fatigue.   HENT:  Negative for congestion.    Eyes:  Negative for visual disturbance.   Respiratory:  Negative for cough.    Cardiovascular: Negative.    Gastrointestinal:  Negative for nausea.   Endocrine: Negative.    Genitourinary: Negative.    Musculoskeletal:  Negative for arthralgias.   Skin: Negative.    Allergic/Immunologic: Negative.  "   Neurological:  Negative for dizziness, tremors, seizures and headaches.   Hematological: Negative.    Psychiatric/Behavioral:  Negative for agitation, behavioral problems, confusion, decreased concentration, dysphoric mood, hallucinations, self-injury, sleep disturbance and suicidal ideas. The patient is not nervous/anxious and is not hyperactive.      Reviewed copied data and there are no changes    Objective   Physical Exam   Constitutional: She is oriented to person, place, and time. She appears well-developed.   HENT:   Head: Normocephalic.   Neurological: She is alert and oriented to person, place, and time.   Psychiatric: Her speech is normal and behavior is normal. Judgment and thought content normal.   Pleasant and cooperative   Vitals reviewed.    There were no vitals taken for this visit.    Medication List:   Current Outpatient Medications   Medication Sig Dispense Refill    DHEA 25 MG capsule Take  by mouth.      estradiol (VIVELLE-DOT) 0.1 MG/24HR patch       lisdexamfetamine dimesylate (Vyvanse) 10 MG capsule Take 1 capsule by mouth Daily 30 capsule 0    Synthroid 50 MCG tablet       vitamin D (ERGOCALCIFEROL) 1.25 MG (77348 UT) capsule capsule Take 1 capsule by mouth 1 (One) Time Per Week.       No current facility-administered medications for this visit.     Reviewed copied data and there are no changes    Mental Status Exam:   Hygiene:   good  Cooperation:  Cooperative  Eye Contact:  Good  Psychomotor Behavior:  Appropriate  Affect:  Full range  Hopelessness: Denies  Speech:  Normal  Thought Process:  Goal directed  Thought Content:  Normal  Suicidal:  None  Homicidal:  None  Hallucinations:  None  Delusion:  None  Memory:  Intact  Orientation:  Person, Place, Time and Situation  Reliability:  good  Insight:  Good  Judgement:  Good  Impulse Control:  Good  Physical/Medical Issues:  No     Assessment & Plan   Problems Addressed this Visit    None  Visit Diagnoses       ADHD (attention deficit  hyperactivity disorder), combined type    -  Primary          Diagnoses         Codes Comments    ADHD (attention deficit hyperactivity disorder), combined type    -  Primary ICD-10-CM: F90.2  ICD-9-CM: 314.01           Functionality: pt having minimal impairment in important areas of daily functioning.  Prognosis: Good dependent on medication/follow up and treatment plan compliance.  UDS reviewed and appropriate.  .  stephanie reviewed.        She continues to be pleased with progress.  She is to continue the adderall for the adhd.  No medication adjustment needed at this time.   Continuing efforts to promote the therapeutic alliance, address the patient's issues, and strengthen self awareness, insights, and coping skills  .  Patient is agreeable to call the  Clinic with worsening symptoms.  .  Patient is aware to call 911 or go to the nearest ER should begin having SI/HI.   Turn to clinic in 12  weeks.  Next ointment will be in person.             This document has been electronically signed by LAURIE Swain on   October 10, 2023 @NOW@.

## 2023-11-15 ENCOUNTER — TELEPHONE (OUTPATIENT)
Dept: FAMILY MEDICINE CLINIC | Facility: CLINIC | Age: 58
End: 2023-11-15
Payer: COMMERCIAL

## 2023-11-15 DIAGNOSIS — F90.2 ADHD (ATTENTION DEFICIT HYPERACTIVITY DISORDER), COMBINED TYPE: Primary | ICD-10-CM

## 2023-11-15 RX ORDER — DEXTROAMPHETAMINE SACCHARATE, AMPHETAMINE ASPARTATE MONOHYDRATE, DEXTROAMPHETAMINE SULFATE AND AMPHETAMINE SULFATE 2.5; 2.5; 2.5; 2.5 MG/1; MG/1; MG/1; MG/1
10 CAPSULE, EXTENDED RELEASE ORAL EVERY MORNING
Qty: 30 CAPSULE | Refills: 0 | Status: SHIPPED | OUTPATIENT
Start: 2023-11-15

## 2023-11-15 NOTE — TELEPHONE ENCOUNTER
Elham called and she would like for you to put her back on Adderral xr 10mg.  Her savings card has  and the medication is expensive

## 2024-02-02 ENCOUNTER — TRANSCRIBE ORDERS (OUTPATIENT)
Dept: ADMINISTRATIVE | Facility: HOSPITAL | Age: 59
End: 2024-02-02
Payer: COMMERCIAL

## 2024-02-02 DIAGNOSIS — Z13.820 ENCOUNTER FOR OSTEOPOROSIS SCREENING IN ASYMPTOMATIC POSTMENOPAUSAL PATIENT: Primary | ICD-10-CM

## 2024-02-02 DIAGNOSIS — Z78.0 ENCOUNTER FOR OSTEOPOROSIS SCREENING IN ASYMPTOMATIC POSTMENOPAUSAL PATIENT: Primary | ICD-10-CM

## 2024-02-02 DIAGNOSIS — Z79.818 LNG TRM (CRNT) USE OF AGNT AFF ESTROG RECPT & ESTROG LEVELS: ICD-10-CM

## 2024-02-06 ENCOUNTER — OFFICE VISIT (OUTPATIENT)
Dept: PSYCHIATRY | Facility: CLINIC | Age: 59
End: 2024-02-06
Payer: COMMERCIAL

## 2024-02-06 VITALS
SYSTOLIC BLOOD PRESSURE: 120 MMHG | OXYGEN SATURATION: 98 % | BODY MASS INDEX: 25.49 KG/M2 | TEMPERATURE: 96.8 F | HEIGHT: 65 IN | WEIGHT: 153 LBS | HEART RATE: 73 BPM | DIASTOLIC BLOOD PRESSURE: 71 MMHG

## 2024-02-06 DIAGNOSIS — F90.2 ADHD (ATTENTION DEFICIT HYPERACTIVITY DISORDER), COMBINED TYPE: Primary | ICD-10-CM

## 2024-02-06 PROCEDURE — 99213 OFFICE O/P EST LOW 20 MIN: CPT | Performed by: NURSE PRACTITIONER

## 2024-02-06 RX ORDER — LISDEXAMFETAMINE DIMESYLATE CAPSULES 10 MG/1
10 CAPSULE ORAL DAILY
Qty: 30 CAPSULE | Refills: 0 | Status: SHIPPED | OUTPATIENT
Start: 2024-02-06

## 2024-02-06 NOTE — PROGRESS NOTES
"      Susana Calderón is a 58 y.o. female is here today for medication management follow-up.“    Chief Complaint:  Recheck on ADHD    History of Present Illness: Patient states that she is doing well.  She has been using the adderall and does not like the medication as well as the vyvanse.  The vyvanse is much \"smoother\" and more efficient for her.  She denies depression.  No excessive anxiety.  Sleeping well.  Uses the stimulant 2-3 days a week.  Mood is stable.  Body mass index is 25.46 kg/m².  No appetite changes. No medical stressors.  Continues to work full time.    PHQ-2 Depression Screening  Little interest or pleasure in doing things? 0-->not at all   Feeling down, depressed, or hopeless? 0-->not at all   PHQ-2 Total Score 0         The following portions of the patient's history were reviewed and updated as appropriate: allergies, current medications, past family history, past medical history, past social history, past surgical history and problem list.    Review of Systems   Constitutional:  Negative for activity change, appetite change and fatigue.   HENT:  Negative for congestion.    Eyes:  Negative for visual disturbance.   Respiratory:  Negative for cough.    Cardiovascular: Negative.    Gastrointestinal:  Negative for nausea.   Endocrine: Negative.    Genitourinary: Negative.    Musculoskeletal:  Negative for arthralgias.   Skin: Negative.    Allergic/Immunologic: Negative.    Neurological:  Negative for dizziness, tremors, seizures and headaches.   Hematological: Negative.    Psychiatric/Behavioral:  Negative for agitation, behavioral problems, confusion, decreased concentration, dysphoric mood, hallucinations, self-injury, sleep disturbance and suicidal ideas. The patient is not nervous/anxious and is not hyperactive.      Reviewed copied data and there are no changes    Objective   Physical Exam   Constitutional: She is oriented to person, place, and time. She appears well-developed. " "  HENT:   Head: Normocephalic.   Neurological: She is alert and oriented to person, place, and time.   Psychiatric: Her speech is normal and behavior is normal. Judgment and thought content normal.   Pleasant and cooperative   Vitals reviewed.    Blood pressure 120/71, pulse 73, temperature 96.8 °F (36 °C), temperature source Temporal, height 165.1 cm (65\"), weight 69.4 kg (153 lb), SpO2 98%.    Medication List:   Current Outpatient Medications   Medication Sig Dispense Refill    DHEA 25 MG capsule Take  by mouth.      estradiol (VIVELLE-DOT) 0.1 MG/24HR patch       Synthroid 50 MCG tablet       vitamin D (ERGOCALCIFEROL) 1.25 MG (17277 UT) capsule capsule Take 1 capsule by mouth 1 (One) Time Per Week.      lisdexamfetamine dimesylate (Vyvanse) 10 MG capsule Take 1 capsule by mouth Daily 30 capsule 0     No current facility-administered medications for this visit.     Reviewed copied data and there are no changes    Mental Status Exam:   Hygiene:   good  Cooperation:  Cooperative  Eye Contact:  Good  Psychomotor Behavior:  Appropriate  Affect:  Full range  Hopelessness: Denies  Speech:  Normal  Thought Process:  Goal directed  Thought Content:  Normal  Suicidal:  None  Homicidal:  None  Hallucinations:  None  Delusion:  None  Memory:  Intact  Orientation:  Person, Place, Time and Situation  Reliability:  good  Insight:  Good  Judgement:  Good  Impulse Control:  Good  Physical/Medical Issues:  No     Assessment & Plan   Problems Addressed this Visit    None  Visit Diagnoses       ADHD (attention deficit hyperactivity disorder), combined type    -  Primary    Relevant Medications    lisdexamfetamine dimesylate (Vyvanse) 10 MG capsule          Diagnoses         Codes Comments    ADHD (attention deficit hyperactivity disorder), combined type    -  Primary ICD-10-CM: F90.2  ICD-9-CM: 314.01           Functionality: pt having minimal impairment in important areas of daily functioning.  Prognosis: Good dependent on " medication/follow up and treatment plan compliance.  UDS reviewed and appropriate.   stephanie reviewed.  Uds performed today and pending      She continues to be pleased with progress.  Placing her back on the vyvanse for the ADHD.  Refill submitted.      Continuing efforts to promote the therapeutic alliance, address the patient's issues, and strengthen self awareness, insights, and coping skills  Patient is agreeable to call the  Clinic with worsening symptoms.  .  Patient is aware to call 911 or go to the nearest ER should begin having SI/HI.   Turn to clinic in 12  weeks.  Telehealth visit.               This document has been electronically signed by LAURIE Swain on   February 6, 2024 @NOW@.

## 2024-04-29 DIAGNOSIS — F90.2 ADHD (ATTENTION DEFICIT HYPERACTIVITY DISORDER), COMBINED TYPE: ICD-10-CM

## 2024-04-29 RX ORDER — LISDEXAMFETAMINE DIMESYLATE CAPSULES 10 MG/1
10 CAPSULE ORAL DAILY
Qty: 30 CAPSULE | Refills: 0 | Status: SHIPPED | OUTPATIENT
Start: 2024-04-29

## 2024-04-29 NOTE — TELEPHONE ENCOUNTER
PATIENT CALLED IN REFILL - PATIENT IS REQUESTING ADDERRALL, NOT LISTED IN MED LIST, [facility] TO GO TO [facility] [address]

## 2024-05-01 ENCOUNTER — TELEPHONE (OUTPATIENT)
Dept: FAMILY MEDICINE CLINIC | Facility: CLINIC | Age: 59
End: 2024-05-01
Payer: COMMERCIAL

## 2024-05-01 DIAGNOSIS — F90.2 ADHD (ATTENTION DEFICIT HYPERACTIVITY DISORDER), COMBINED TYPE: Primary | ICD-10-CM

## 2024-05-01 RX ORDER — DEXTROAMPHETAMINE SACCHARATE, AMPHETAMINE ASPARTATE MONOHYDRATE, DEXTROAMPHETAMINE SULFATE AND AMPHETAMINE SULFATE 2.5; 2.5; 2.5; 2.5 MG/1; MG/1; MG/1; MG/1
10 CAPSULE, EXTENDED RELEASE ORAL DAILY
Qty: 30 CAPSULE | Refills: 0 | Status: SHIPPED | OUTPATIENT
Start: 2024-05-01 | End: 2025-05-01

## 2024-05-07 ENCOUNTER — TELEMEDICINE (OUTPATIENT)
Dept: PSYCHIATRY | Facility: CLINIC | Age: 59
End: 2024-05-07
Payer: COMMERCIAL

## 2024-05-07 DIAGNOSIS — F90.2 ADHD (ATTENTION DEFICIT HYPERACTIVITY DISORDER), COMBINED TYPE: Primary | ICD-10-CM

## 2024-05-07 PROCEDURE — 99213 OFFICE O/P EST LOW 20 MIN: CPT | Performed by: NURSE PRACTITIONER

## 2024-08-13 ENCOUNTER — TELEMEDICINE (OUTPATIENT)
Dept: PSYCHIATRY | Facility: CLINIC | Age: 59
End: 2024-08-13
Payer: COMMERCIAL

## 2024-08-13 DIAGNOSIS — F90.2 ADHD (ATTENTION DEFICIT HYPERACTIVITY DISORDER), COMBINED TYPE: Primary | ICD-10-CM

## 2024-08-13 PROCEDURE — 99213 OFFICE O/P EST LOW 20 MIN: CPT | Performed by: NURSE PRACTITIONER

## 2024-08-13 RX ORDER — LISDEXAMFETAMINE DIMESYLATE 20 MG/1
20 CAPSULE ORAL EVERY MORNING
Qty: 30 CAPSULE | Refills: 0 | Status: SHIPPED | OUTPATIENT
Start: 2024-08-13

## 2024-08-13 NOTE — PROGRESS NOTES
Subjective   Elham Calderón is a 58 y.o. female is here today for medication management follow-up. “This provider is located at [facility], [address] [place] [address]. The provider identified herself as well as her credentials.   The Patient is located at work in [facility]. The patient's condition being diagnosed/treated is appropriate for telemedicine. The patient gave consent to be seen remotely, and when consent is given they understand that the consent allows for patient identifiable information to be sent to a third party as needed.   They may refuse to be seen remotely at any time. The electronic data is encrypted and password protected, and the patient has been advised of the potential risks to privacy not withstanding such measures.       Chief Complaint:  Recheck on ADHD    History of Present Illness: Patient states that she is doing well.  She usually takes the stimulant 2-3 times a week.  With the med she is able to complete patient's charts and get caught back up on required charting.  It helps with focus and concentration as well as motivation.  Denies any depression or excessive anxiety.  Mood is stable. No negative side effects to the med.  Sleep is adequate.  She would like to have vyvanse this visit as it lasts longer and has a smoother effect for her the problem has been the cost.            The following portions of the patient's history were reviewed and updated as appropriate: allergies, current medications, past family history, past medical history, past social history, past surgical history and problem list.    Review of Systems   Constitutional:  Negative for activity change, appetite change and fatigue.   HENT:  Negative for congestion.    Eyes:  Negative for visual disturbance.   Respiratory:  Negative for cough.    Cardiovascular: Negative.    Gastrointestinal:  Negative for nausea.   Endocrine: Negative.    Genitourinary: Negative.    Musculoskeletal:  Negative  for arthralgias.   Skin: Negative.    Allergic/Immunologic: Negative.    Neurological:  Negative for dizziness, tremors, seizures and headaches.   Hematological: Negative.    Psychiatric/Behavioral:  Negative for agitation, behavioral problems, confusion, decreased concentration, dysphoric mood, hallucinations, self-injury, sleep disturbance and suicidal ideas. The patient is not nervous/anxious and is not hyperactive.      Reviewed copied data and there are no changes    Objective   Physical Exam   Constitutional: She is oriented to person, place, and time. She appears well-developed.   HENT:   Head: Normocephalic.   Neurological: She is alert and oriented to person, place, and time.   Psychiatric: Her speech is normal and behavior is normal. Judgment and thought content normal.   Pleasant and cooperative   Vitals reviewed.    There were no vitals taken for this visit.    Medication List:   Current Outpatient Medications   Medication Sig Dispense Refill    amphetamine-dextroamphetamine XR (Adderall XR) 10 MG 24 hr capsule Take 1 capsule by mouth Daily 30 capsule 0    DHEA 25 MG capsule Take  by mouth.      estradiol (VIVELLE-DOT) 0.1 MG/24HR patch       lisdexamfetamine (Vyvanse) 20 MG capsule Take 1 capsule by mouth Every Morning 30 capsule 0    Synthroid 50 MCG tablet       vitamin D (ERGOCALCIFEROL) 1.25 MG (88103 UT) capsule capsule Take 1 capsule by mouth 1 (One) Time Per Week.       No current facility-administered medications for this visit.     Reviewed copied data and there are no changes    Mental Status Exam:   Hygiene:   good  Cooperation:  Cooperative  Eye Contact:  Good  Psychomotor Behavior:  Appropriate  Affect:  Full range  Hopelessness: Denies  Speech:  Normal  Thought Process:  Goal directed  Thought Content:  Normal  Suicidal:  None  Homicidal:  None  Hallucinations:  None  Delusion:  None  Memory:  Intact  Orientation:  Person, Place, Time and Situation  Reliability:  good  Insight:   Good  Judgement:  Good  Impulse Control:  Good  Physical/Medical Issues:  No     Assessment & Plan   Problems Addressed this Visit    None  Visit Diagnoses       ADHD (attention deficit hyperactivity disorder), combined type    -  Primary    Relevant Medications    lisdexamfetamine (Vyvanse) 20 MG capsule          Diagnoses         Codes Comments    ADHD (attention deficit hyperactivity disorder), combined type    -  Primary ICD-10-CM: F90.2  ICD-9-CM: 314.01           Functionality: pt having minimal impairment in important areas of daily functioning.  Prognosis: Good dependent on medication/follow up and treatment plan compliance.  UDS reviewed and appropriate.   stephanie reviewed.        She continues to be pleased with progress.  Today sent in the vyvanse prescription.      Continuing efforts to promote the therapeutic alliance, address the patient's issues, and strengthen self awareness, insights, and coping skills  Patient is agreeable to call the  Clinic with worsening symptoms.  .  Patient is aware to call 911 or go to the nearest ER should begin having SI/HI.   reTurn to clinic in 12  weeks.  Telehealth visit.               This document has been electronically signed by LAURIE Swain on   August 13, 2024 @NOW@.

## 2024-11-13 ENCOUNTER — TELEMEDICINE (OUTPATIENT)
Dept: PSYCHIATRY | Facility: CLINIC | Age: 59
End: 2024-11-13
Payer: COMMERCIAL

## 2024-11-13 DIAGNOSIS — F90.2 ADHD (ATTENTION DEFICIT HYPERACTIVITY DISORDER), COMBINED TYPE: ICD-10-CM

## 2024-11-13 PROCEDURE — 99213 OFFICE O/P EST LOW 20 MIN: CPT | Performed by: NURSE PRACTITIONER

## 2024-11-13 RX ORDER — LISDEXAMFETAMINE DIMESYLATE 20 MG/1
20 CAPSULE ORAL EVERY MORNING
Qty: 30 CAPSULE | Refills: 0 | Status: SHIPPED | OUTPATIENT
Start: 2024-11-13

## 2024-11-13 NOTE — PROGRESS NOTES
Subjective   Elham Calderón is a 59 y.o. female is here today for medication management follow-up. “This provider is located at [facility], [address] [place] [address]. The provider identified herself as well as her credentials.   The Patient is located at work in [facility]. The patient's condition being diagnosed/treated is appropriate for telemedicine. The patient gave consent to be seen remotely, and when consent is given they understand that the consent allows for patient identifiable information to be sent to a third party as needed.   They may refuse to be seen remotely at any time. The electronic data is encrypted and password protected, and the patient has been advised of the potential risks to privacy not withstanding such measures.       Chief Complaint:  Recheck on ADHD    History of Present Illness: Patient states that she is doing well.  She did not actually start the Vyvanse.  She still had some Adderall.  She only takes the medication about 2 days a week.  She states the stimulant does continue to help with focus and concentration and she is planning on getting the Vyvanse 1 filled this time.  She denies any depression or excessive anxiety.  Continues to be very busy working full-time.  Sleep is adequate.  Mood is stable.  No medical stressors.        The following portions of the patient's history were reviewed and updated as appropriate: allergies, current medications, past family history, past medical history, past social history, past surgical history and problem list.    Review of Systems   Constitutional:  Negative for activity change, appetite change and fatigue.   HENT:  Negative for congestion.    Eyes:  Negative for visual disturbance.   Respiratory:  Negative for cough.    Cardiovascular: Negative.    Gastrointestinal:  Negative for nausea.   Endocrine: Negative.    Genitourinary: Negative.    Musculoskeletal:  Negative for arthralgias.   Skin: Negative.     Allergic/Immunologic: Negative.    Neurological:  Negative for dizziness, tremors, seizures and headaches.   Hematological: Negative.    Psychiatric/Behavioral:  Negative for agitation, behavioral problems, confusion, decreased concentration, dysphoric mood, hallucinations, self-injury, sleep disturbance and suicidal ideas. The patient is not nervous/anxious and is not hyperactive.      Reviewed copied data and there are no changes    Objective   Physical Exam   Constitutional: She is oriented to person, place, and time. She appears well-developed.   HENT:   Head: Normocephalic.   Neurological: She is alert and oriented to person, place, and time.   Psychiatric: Her speech is normal and behavior is normal. Judgment and thought content normal.   Pleasant and cooperative   Vitals reviewed.    There were no vitals taken for this visit.    Medication List:   Current Outpatient Medications   Medication Sig Dispense Refill    lisdexamfetamine (Vyvanse) 20 MG capsule Take 1 capsule by mouth Every Morning 30 capsule 0    DHEA 25 MG capsule Take  by mouth.      estradiol (VIVELLE-DOT) 0.1 MG/24HR patch       Synthroid 50 MCG tablet       vitamin D (ERGOCALCIFEROL) 1.25 MG (43473 UT) capsule capsule Take 1 capsule by mouth 1 (One) Time Per Week.       No current facility-administered medications for this visit.     Reviewed copied data and there are no changes    Mental Status Exam:   Hygiene:   good  Cooperation:  Cooperative  Eye Contact:  Good  Psychomotor Behavior:  Appropriate  Affect:  Full range  Hopelessness: Denies  Speech:  Normal  Thought Process:  Goal directed  Thought Content:  Normal  Suicidal:  None  Homicidal:  None  Hallucinations:  None  Delusion:  None  Memory:  Intact  Orientation:  Person, Place, Time and Situation  Reliability:  good  Insight:  Good  Judgement:  Good  Impulse Control:  Good  Physical/Medical Issues:  No     Assessment & Plan   Problems Addressed this Visit    None  Visit Diagnoses        ADHD (attention deficit hyperactivity disorder), combined type        Relevant Medications    lisdexamfetamine (Vyvanse) 20 MG capsule          Diagnoses         Codes Comments    ADHD (attention deficit hyperactivity disorder), combined type     ICD-10-CM: F90.2  ICD-9-CM: 314.01           Functionality: pt having minimal impairment in important areas of daily functioning.  Prognosis: Good dependent on medication/follow up and treatment plan compliance.  UDS reviewed and appropriate.   stephanie reviewed.        She continues to be pleased with progress.  Today sent in the vyvanse prescription.  She will start that medication.    Continuing efforts to promote the therapeutic alliance, address the patient's issues, and strengthen self awareness, insights, and coping skills  Patient is agreeable to call the  Clinic with worsening symptoms.  .  Patient is aware to call 911 or go to the nearest ER should begin having SI/HI.   reTurn to clinic in 12  weeks.  Next visit will be an in person visit.             This document has been electronically signed by LAURIE Swain on   November 13, 2024 @NOW@.

## 2025-02-13 ENCOUNTER — OFFICE VISIT (OUTPATIENT)
Dept: PSYCHIATRY | Facility: CLINIC | Age: 60
End: 2025-02-13
Payer: COMMERCIAL

## 2025-02-13 VITALS
WEIGHT: 154.6 LBS | DIASTOLIC BLOOD PRESSURE: 81 MMHG | SYSTOLIC BLOOD PRESSURE: 136 MMHG | OXYGEN SATURATION: 98 % | HEIGHT: 65 IN | HEART RATE: 72 BPM | BODY MASS INDEX: 25.76 KG/M2

## 2025-02-13 DIAGNOSIS — F90.2 ADHD (ATTENTION DEFICIT HYPERACTIVITY DISORDER), COMBINED TYPE: Primary | ICD-10-CM

## 2025-02-13 DIAGNOSIS — G47.9 TROUBLE IN SLEEPING: ICD-10-CM

## 2025-02-13 PROCEDURE — 99214 OFFICE O/P EST MOD 30 MIN: CPT | Performed by: NURSE PRACTITIONER

## 2025-02-13 RX ORDER — PROGESTERONE 200 MG/1
CAPSULE ORAL
COMMUNITY
Start: 2024-10-25

## 2025-02-13 RX ORDER — TRAZODONE HYDROCHLORIDE 50 MG/1
50 TABLET, FILM COATED ORAL NIGHTLY
Qty: 30 TABLET | Refills: 2 | Status: SHIPPED | OUTPATIENT
Start: 2025-02-13

## 2025-02-13 RX ORDER — LISDEXAMFETAMINE DIMESYLATE 10 MG/1
10 CAPSULE ORAL EVERY MORNING
Qty: 30 CAPSULE | Refills: 0 | Status: SHIPPED | OUTPATIENT
Start: 2025-02-13

## 2025-02-13 NOTE — PROGRESS NOTES
"      Susana Calderón is a 59 y.o. female is here today for medication management follow-up.        Chief Complaint:  Recheck on ADHD and trouble sleeping    History of Present Illness:   History of Present Illness  The patient is a 59-year-old female who presents for evaluation of insomnia and ADHD.    She reports experiencing sleep disturbances, characterized by difficulty both initiating and maintaining sleep. She does not endorse any feelings of depression but acknowledges significant stress related to her work with [employer] plans and a recent relationship issue. Her sleep duration varies, ranging from 2 to 4 hours per night. She has previously found trazodone effective for her sleep issues and is considering resuming this medication. She also expresses a desire to return to her regular exercise routine, which has been disrupted. She was prescribed Ambien during a trip to [place] in 11/2024, which she continues to use sparingly due to its adverse effects on her well-being.    She is currently on Vyvanse 20 mg, which she finds beneficial for focus and concentration, but notes that it exacerbates her sleep issues. She is using of Vyvanse use to 3 or 4 days per week. She is requesting a refill of Vyvanse today.  The medication does make her \"jittery\" at times but really helps with her focus and concentration.    Supplemental Information  She is currently on progesterone.    MEDICATIONS  Current: Vyvanse, progesterone, Ambien.  Past: Trazodone.         The following portions of the patient's history were reviewed and updated as appropriate: allergies, current medications, past family history, past medical history, past social history, past surgical history and problem list.    Review of Systems   Constitutional:  Negative for activity change, appetite change and fatigue.   HENT:  Negative for congestion.    Eyes:  Negative for visual disturbance.   Respiratory:  Negative for cough.  " "  Cardiovascular: Negative.    Gastrointestinal:  Negative for nausea.   Endocrine: Negative.    Genitourinary: Negative.    Musculoskeletal:  Negative for arthralgias.   Skin: Negative.    Allergic/Immunologic: Negative.    Neurological:  Negative for dizziness, tremors, seizures and headaches.   Hematological: Negative.    Psychiatric/Behavioral:  Positive for decreased concentration and sleep disturbance. Negative for agitation, behavioral problems, confusion, dysphoric mood, hallucinations, self-injury and suicidal ideas. The patient is not nervous/anxious and is not hyperactive.      Reviewed copied data and there are no changes    Objective   Physical Exam   Constitutional: She is oriented to person, place, and time. She appears well-developed.   HENT:   Head: Normocephalic.   Neurological: She is alert and oriented to person, place, and time.   Psychiatric: Her speech is normal and behavior is normal. Judgment and thought content normal.   Pleasant and cooperative   Vitals reviewed.    Blood pressure 136/81, pulse 72, height 165.1 cm (65\"), weight 70.1 kg (154 lb 9.6 oz), SpO2 98%.    Medication List:   Current Outpatient Medications   Medication Sig Dispense Refill    lisdexamfetamine dimesylate (Vyvanse) 10 MG capsule Take 1 capsule by mouth Every Morning 30 capsule 0    Progesterone (PROMETRIUM) 200 MG capsule       DHEA 25 MG capsule Take  by mouth.      estradiol (VIVELLE-DOT) 0.1 MG/24HR patch       traZODone (DESYREL) 50 MG tablet Take 1 tablet by mouth Every Night. 30 tablet 2    vitamin D (ERGOCALCIFEROL) 1.25 MG (05903 UT) capsule capsule Take 1 capsule by mouth 1 (One) Time Per Week.       No current facility-administered medications for this visit.     Reviewed copied data and there are no changes    Mental Status Exam:   Hygiene:   good  Cooperation:  Cooperative  Eye Contact:  Good  Psychomotor Behavior:  Appropriate  Affect:  Full range  Hopelessness: Denies  Speech:  Normal  Thought Process:  " Goal directed  Thought Content:  Normal  Suicidal:  None  Homicidal:  None  Hallucinations:  None  Delusion:  None  Memory:  Intact  Orientation:  Person, Place, Time and Situation  Reliability:  good  Insight:  Good  Judgement:  Good  Impulse Control:  Good  Physical/Medical Issues:  No     Assessment & Plan   Problems Addressed this Visit    None  Visit Diagnoses       ADHD (attention deficit hyperactivity disorder), combined type    -  Primary    Relevant Medications    lisdexamfetamine dimesylate (Vyvanse) 10 MG capsule    traZODone (DESYREL) 50 MG tablet    Trouble in sleeping        Relevant Medications    traZODone (DESYREL) 50 MG tablet          Diagnoses         Codes Comments    ADHD (attention deficit hyperactivity disorder), combined type    -  Primary ICD-10-CM: F90.2  ICD-9-CM: 314.01     Trouble in sleeping     ICD-10-CM: G47.9  ICD-9-CM: 780.50           Functionality: pt having minimal impairment in important areas of daily functioning.  Prognosis: Good dependent on medication/follow up and treatment plan compliance.  UDS reviewed and appropriate.   stephanie reviewed.  UDS performed today and pending      Assessment & Plan  1. Insomnia.  She reports difficulty both falling asleep and staying asleep, exacerbated by her use of Vyvanse. A prescription for trazodone 50 mg will be issued, with instructions to break the tablet in half and take 25 mg at bedtime. The prescription will be sent to [facility].    2. Attention deficit hyperactivity disorder (ADHD).  She is currently taking Vyvanse 20 mg but reports it affects her sleep and causes jitteriness. A prescription for Vyvanse 10 mg will be provided, with the option to increase back to 20 mg after a month if necessary. The prescription will be sent to [facility].    Follow-up  The patient is scheduled for a telehealth follow-up visit on 05/14/2025 at 8:30 AM.     Continuing efforts to promote the therapeutic alliance, address the patient's  issues, and strengthen self awareness, insights, and coping skills  Patient is agreeable to call the  Clinic with worsening symptoms.  .  Patient is aware to call 911 or go to the nearest ER should begin having SI/HI.   Patient or patient representative verbalized consent for the use of Ambient Listening during the visit with  LAURIE Swain for chart documentation. 2/13/2025  08:58 EST              This document has been electronically signed by LAURIE Swain on   February 13, 2025 @NOW@.

## 2025-04-28 DIAGNOSIS — F90.2 ADHD (ATTENTION DEFICIT HYPERACTIVITY DISORDER), COMBINED TYPE: ICD-10-CM

## 2025-04-28 RX ORDER — LISDEXAMFETAMINE DIMESYLATE 10 MG/1
10 CAPSULE ORAL EVERY MORNING
Qty: 30 CAPSULE | Refills: 0 | Status: SHIPPED | OUTPATIENT
Start: 2025-04-28

## 2025-05-15 ENCOUNTER — TELEMEDICINE (OUTPATIENT)
Dept: PSYCHIATRY | Facility: CLINIC | Age: 60
End: 2025-05-15
Payer: COMMERCIAL

## 2025-05-15 DIAGNOSIS — G47.9 TROUBLE IN SLEEPING: ICD-10-CM

## 2025-05-15 DIAGNOSIS — F90.2 ADHD (ATTENTION DEFICIT HYPERACTIVITY DISORDER), COMBINED TYPE: Primary | ICD-10-CM

## 2025-05-15 PROCEDURE — 99214 OFFICE O/P EST MOD 30 MIN: CPT | Performed by: NURSE PRACTITIONER

## 2025-05-15 RX ORDER — TRAZODONE HYDROCHLORIDE 50 MG/1
50 TABLET ORAL NIGHTLY
Qty: 30 TABLET | Refills: 2 | Status: SHIPPED | OUTPATIENT
Start: 2025-05-15

## 2025-05-15 NOTE — PROGRESS NOTES
Subjective   Elham Calderón is a 59 y.o. female is here today for medication management follow-up.  “This provider is located at [facility], [address] [place] [address]. The provider identified herself as well as her credentials.   The Patient is located at home. The patient's condition being diagnosed/treated is appropriate for telemedicine. The patient gave consent to be seen remotely, and when consent is given they understand that the consent allows for patient identifiable information to be sent to a third party as needed.   They may refuse to be seen remotely at any time. The electronic data is encrypted and password protected, and the patient has been advised of the potential risks to privacy not withstanding such measures.   Patient or patient representative verbalized consent for the use of Ambient Listening during the visit with  LAURIE Swain for chart documentation. 5/15/2025  08:22 EDT     Chief Complaint:  Recheck on ADHD and trouble sleeping    History of Present Illness:   History of Present Illness  The patient is a 59-year-old female who presents for evaluation of insomnia and ADHD.        Supplemental Information  She is currently on progesterone.    MEDICATIONS  Current: Vyvanse, progesterone, Ambien.  Past: Trazodone.      The patient is a 55-year-old female who presents for evaluation of anxiety and sleep issues.    She reports an upcoming trip to [place] in the third week of July with a friend and looking forward to that.  Continues to work full time.  Mood is stable.  No excessive irritability.  Uses the stimulant several days a week mainly on work days.      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 She mentions that Vyvanse has been effective in managing her symptoms, aiding in focus, concentration, and task completion. Trazodone has  been beneficial for her sleep, which she describes as adequate. Her mood is stable. Denies any depression.      Social History:  - Planning a career transition to photography  - Upcoming trip to [place]         The following portions of the patient's history were reviewed and updated as appropriate: allergies, current medications, past family history, past medical history, past social history, past surgical history and problem list.    Review of Systems   Constitutional:  Negative for activity change, appetite change and fatigue.   HENT:  Negative for congestion.    Eyes:  Negative for visual disturbance.   Respiratory:  Negative for cough.    Cardiovascular: Negative.    Gastrointestinal:  Negative for nausea.   Endocrine: Negative.    Genitourinary: Negative.    Musculoskeletal:  Negative for arthralgias.   Skin: Negative.    Allergic/Immunologic: Negative.    Neurological:  Negative for dizziness, tremors, seizures and headaches.   Hematological: Negative.    Psychiatric/Behavioral:  Positive for decreased concentration and sleep disturbance. Negative for agitation, behavioral problems, confusion, dysphoric mood, hallucinations, self-injury and suicidal ideas. The patient is not nervous/anxious and is not hyperactive.      Reviewed copied data and there are no changes    Objective   Physical Exam   Constitutional: She is oriented to person, place, and time. She appears well-developed.   HENT:   Head: Normocephalic.   Neurological: She is alert and oriented to person, place, and time.   Psychiatric: Her speech is normal and behavior is normal. Judgment and thought content normal.   Pleasant and cooperative   Vitals reviewed.    There were no vitals taken for this visit.    Medication List:   Current Outpatient Medications   Medication Sig Dispense Refill    DHEA 25 MG capsule Take  by mouth.      estradiol (VIVELLE-DOT) 0.1 MG/24HR patch       lisdexamfetamine dimesylate (Vyvanse) 10 MG capsule Take 1 capsule  by mouth Every Morning 30 capsule 0    Progesterone (PROMETRIUM) 200 MG capsule       traZODone (DESYREL) 50 MG tablet Take 1 tablet by mouth Every Night. 30 tablet 2    vitamin D (ERGOCALCIFEROL) 1.25 MG (28669 UT) capsule capsule Take 1 capsule by mouth 1 (One) Time Per Week.       No current facility-administered medications for this visit.     Reviewed copied data and there are no changes    Mental Status Exam:   Hygiene:   good  Cooperation:  Cooperative  Eye Contact:  Good  Psychomotor Behavior:  Appropriate  Affect:  Full range  Hopelessness: Denies  Speech:  Normal  Thought Process:  Goal directed  Thought Content:  Normal  Suicidal:  None  Homicidal:  None  Hallucinations:  None  Delusion:  None  Memory:  Intact  Orientation:  Person, Place, Time and Situation  Reliability:  good  Insight:  Good  Judgement:  Good  Impulse Control:  Good  Physical/Medical Issues:  No     Assessment & Plan   Problems Addressed this Visit    None  Visit Diagnoses         ADHD (attention deficit hyperactivity disorder), combined type    -  Primary      Trouble in sleeping              Diagnoses         Codes Comments      ADHD (attention deficit hyperactivity disorder), combined type    -  Primary ICD-10-CM: F90.2  ICD-9-CM: 314.01       Trouble in sleeping     ICD-10-CM: G47.9  ICD-9-CM: 780.50           Functionality: pt having minimal impairment in important areas of daily functioning.  Prognosis: Good dependent on medication/follow up and treatment plan compliance.  UDS reviewed and appropriate.   stephanie reviewed.        Assessment & Plan  Problems:  - Anxiety  - Sleep issues    Content of Therapy:  The session included discussions about upcoming travel plans, exploring new experiences, and stepping out of comfort zones. y. Additionally, the patient discussed her future career plans and senior living goals.    Clinical Impression:  The patient is maintaining a stable mood and reports that Vyvanse continues to be effective in  improving focus, concentration, and task completion. Sleep quality is adequate with the use of trazodone,  Overall, the patient appears to be in good spirits and is looking forward to her upcoming trip and future endeavors.        Plan:  - Continue current dosage of Vyvanse for anxiety management.  - Refill trazodone 50 mg prescription to be sent to pharmacy.      Follow-up:  Next appointment scheduled for 08/19/2025 at 8:00 AM via video visit. Goals for the session include discussing the patient's experiences from the trip and evaluating any changes in mood or anxiety levels.    Notes & Risk Factors:  *     Continuing efforts to promote the therapeutic alliance, address the patient's issues, and strengthen self awareness, insights, and coping skills  Patient is agreeable to call the  Clinic with worsening symptoms.  .  Patient is aware to call 911 or go to the nearest ER should begin having SI/HI.   Patient or patient representative verbalized consent for the use of Ambient Listening during the visit with  LAURIE Swain for chart documentation. 5/15/2025  08:58 EST              This document has been electronically signed by LAURIE Swain on   May 15, 2025 @NOW@.

## 2025-08-19 ENCOUNTER — TELEMEDICINE (OUTPATIENT)
Dept: PSYCHIATRY | Facility: CLINIC | Age: 60
End: 2025-08-19

## 2025-08-19 DIAGNOSIS — F90.2 ADHD (ATTENTION DEFICIT HYPERACTIVITY DISORDER), COMBINED TYPE: Primary | ICD-10-CM

## 2025-08-19 PROCEDURE — 99213 OFFICE O/P EST LOW 20 MIN: CPT | Performed by: NURSE PRACTITIONER

## 2025-08-19 RX ORDER — DEXTROAMPHETAMINE SACCHARATE, AMPHETAMINE ASPARTATE MONOHYDRATE, DEXTROAMPHETAMINE SULFATE AND AMPHETAMINE SULFATE 2.5; 2.5; 2.5; 2.5 MG/1; MG/1; MG/1; MG/1
10 CAPSULE, EXTENDED RELEASE ORAL DAILY
Qty: 30 CAPSULE | Refills: 0 | Status: SHIPPED | OUTPATIENT
Start: 2025-08-19 | End: 2026-08-19

## (undated) DEVICE — MODEL BT2000 P/N 700287-012KIT CONTENTS: MANIFOLD WITH SALINE AND CONTRAST PORTS, SALINE TUBING WITH SPIKE AND HAND SYRINGE, TRANSDUCER: Brand: BT2000 AUTOMATED MANIFOLD KIT

## (undated) DEVICE — MODEL AT P54, P/N 700608-035KIT CONTENTS: HAND CONTROLLER, 3-WAY HIGH-PRESSURE STOPCOCK WITH ROTATING END AND PREMIUM HIGH-PRESSURE TUBING: Brand: ANGIOTOUCH® KIT

## (undated) DEVICE — ST EXT IV SMARTSITE 2VLV SP M LL 5ML IV1

## (undated) DEVICE — CATH DIAG EXPO .045 FL3  5F 100CM

## (undated) DEVICE — GLIDESHEATH SLENDER STAINLESS STEEL KIT: Brand: GLIDESHEATH SLENDER

## (undated) DEVICE — CATH DIAG EXPO M/ PK 5F FL4/FR4 PIG

## (undated) DEVICE — PK CATH CARD 10

## (undated) DEVICE — DEV COMP RAD PRELUDESYNC 24CM

## (undated) DEVICE — GW INQWIRE FC PTFE STD J/1.5 .035 260

## (undated) DEVICE — TR BAND RADIAL ARTERY COMPRESSION DEVICE: Brand: TR BAND

## (undated) DEVICE — ST INF PRI SMRTSTE 20DRP 2VLV 24ML 117

## (undated) DEVICE — A2000 MULTI-USE SYRINGE KIT, P/N 701277-003KIT CONTENTS: 100ML CONTRAST RESERVOIR AND TUBING WITH CONTRAST SPIKE AND CLAMP: Brand: A2000 MULTI-USE SYRINGE KIT